# Patient Record
Sex: MALE | Race: WHITE | Employment: FULL TIME | URBAN - METROPOLITAN AREA
[De-identification: names, ages, dates, MRNs, and addresses within clinical notes are randomized per-mention and may not be internally consistent; named-entity substitution may affect disease eponyms.]

---

## 2018-08-25 ENCOUNTER — HOSPITAL ENCOUNTER (OUTPATIENT)
Dept: ULTRASOUND IMAGING | Age: 53
Discharge: HOME | End: 2018-08-25
Attending: NURSE PRACTITIONER
Payer: COMMERCIAL

## 2018-08-25 DIAGNOSIS — R19.09 GROIN SWELLING: ICD-10-CM

## 2018-08-25 PROCEDURE — 76870 US EXAM SCROTUM: CPT

## 2022-02-15 ENCOUNTER — OFFICE VISIT (OUTPATIENT)
Dept: INTERNAL MEDICINE CLINIC | Age: 57
End: 2022-02-15
Payer: COMMERCIAL

## 2022-02-15 VITALS
BODY MASS INDEX: 41.58 KG/M2 | TEMPERATURE: 98.3 F | DIASTOLIC BLOOD PRESSURE: 111 MMHG | OXYGEN SATURATION: 90 % | RESPIRATION RATE: 19 BRPM | SYSTOLIC BLOOD PRESSURE: 176 MMHG | WEIGHT: 307 LBS | HEART RATE: 96 BPM | HEIGHT: 72 IN

## 2022-02-15 DIAGNOSIS — R29.818 SUSPECTED SLEEP APNEA: ICD-10-CM

## 2022-02-15 DIAGNOSIS — I10 ESSENTIAL HYPERTENSION: ICD-10-CM

## 2022-02-15 DIAGNOSIS — R73.09 ELEVATED GLUCOSE: ICD-10-CM

## 2022-02-15 DIAGNOSIS — R06.09 DOE (DYSPNEA ON EXERTION): ICD-10-CM

## 2022-02-15 DIAGNOSIS — E78.2 MIXED HYPERLIPIDEMIA: ICD-10-CM

## 2022-02-15 DIAGNOSIS — Z12.11 COLON CANCER SCREENING: ICD-10-CM

## 2022-02-15 DIAGNOSIS — R63.5 WEIGHT GAIN: ICD-10-CM

## 2022-02-15 DIAGNOSIS — Z76.89 ESTABLISHING CARE WITH NEW DOCTOR, ENCOUNTER FOR: Primary | ICD-10-CM

## 2022-02-15 DIAGNOSIS — R35.1 NOCTURIA: ICD-10-CM

## 2022-02-15 DIAGNOSIS — Z11.59 SCREENING FOR VIRAL DISEASE: ICD-10-CM

## 2022-02-15 DIAGNOSIS — R53.83 FATIGUE, UNSPECIFIED TYPE: ICD-10-CM

## 2022-02-15 LAB
BILIRUB UR QL STRIP: NEGATIVE
GLUCOSE UR-MCNC: NEGATIVE MG/DL
KETONES P FAST UR STRIP-MCNC: NEGATIVE MG/DL
PH UR STRIP: 6 [PH] (ref 4.6–8)
PROT UR QL STRIP: NORMAL
SP GR UR STRIP: 1.03 (ref 1–1.03)
UA UROBILINOGEN AMB POC: NORMAL (ref 0.2–1)
URINALYSIS CLARITY POC: CLEAR
URINALYSIS COLOR POC: YELLOW
URINE BLOOD POC: NEGATIVE
URINE LEUKOCYTES POC: NEGATIVE
URINE NITRITES POC: NEGATIVE

## 2022-02-15 PROCEDURE — 99203 OFFICE O/P NEW LOW 30 MIN: CPT | Performed by: NURSE PRACTITIONER

## 2022-02-15 PROCEDURE — 81003 URINALYSIS AUTO W/O SCOPE: CPT | Performed by: NURSE PRACTITIONER

## 2022-02-15 RX ORDER — LISINOPRIL AND HYDROCHLOROTHIAZIDE 10; 12.5 MG/1; MG/1
1 TABLET ORAL DAILY
Qty: 90 TABLET | Refills: 0 | Status: SHIPPED | OUTPATIENT
Start: 2022-02-15 | End: 2022-04-11 | Stop reason: SDUPTHER

## 2022-02-15 NOTE — PROGRESS NOTES
Subjective: (As above and below)     Chief Complaint   Patient presents with   [address]     pt states he was taking clonidine and lisinopril for BP but stopped a while ago     Sleep Problem     pt states he falls asleep anywhere and fallen d/t sleep  x 1 year has gotten worse in past 5-6 months     Weight Gain     pt states in past year he has put on a lot of weight fast (210lbs a year ago), often has SOB     Mery Mantle. Gale Yee is a 61y.o. year old male who presents for     Est care    Last PCP 3 years ago    Sleep apnea s/s: daytime somnolence, snoring    Weight gain; over the past 3 years has gained nearly 100 lbs  He did quit smoking      Colonoscopy: never done, no GI s/s, no fam hx of colon ca    Prostatic s/s: +nocturia    TREVIZO: able to talk from elevator to office w/o problems but more than that gets winded. No chest pain  No wheezing he quit smoking  He has bilateral lower ext edema, compression stockings help some  Swelling improves w/ elevation  He works at [employer]  S/s present for months    HTN; was on lisinopril and clonidine before, ran out and then lost to f/u  No a/e w/ these meds  Diet needs improvement        Reviewed PmHx, RxHx, FmHx, SocHx, AllgHx and updated in chart. Family History   Family history unknown: Yes       Past Medical History:   Diagnosis Date    Hypertension       Social History     Socioeconomic History    Marital status: SINGLE   Tobacco Use    Smoking status: Former Smoker    Smokeless tobacco: Never Used   Vaping Use    Vaping Use: Never used   Substance and Sexual Activity    Alcohol use:  Yes     Alcohol/week: 15.0 standard drinks     Types: 10 Cans of beer, 5 Shots of liquor per week     Comment: pt states he is unable to give good estimate but does drink a lot     Drug use: Not Currently    Sexual activity: Not Currently     Partners: Female          Current Outpatient Medications   Medication Sig    lisinopril-hydroCHLOROthiazide (PRINZIDE, ZESTORETIC) 10-12.5 mg per tablet Take 1 Tablet by mouth daily. No current facility-administered medications for this visit. Review of Systems:   Constitutional:    Negative for fever and chills, negative diaphoresis. HEENT:              Negative for neck pain and stiffness. Eyes:                  Negative for visual disturbance, itching, redness or discharge. Respiratory:        Negative for cough and shortness of breath. Cardiovascular:  Negative for chest pain and palpitations. Gastrointestinal: Negative for nausea, vomiting, abdominal pain, diarrhea or constipation. Genitourinary:     Negative for dysuria and frequency. Musculoskeletal: Negative for falls, tenderness and swelling. Skin:                    Negative for rash, masses or lesions. Neurological:       Negative for dizzyness, seizure, loss of consciousness, weakness and numbness. Objective:     Vitals:    02/15/22 1037 02/15/22 1105   BP: (!) 173/115 (!) 176/111   Pulse: (!) 101 96   Resp: 19    Temp: 98.3 °F (36.8 °C)    TempSrc: Temporal    SpO2: (!) 85% (!) 85%   Weight: 307 lb (139.3 kg)    Height: 6' (1.829 m)          Gen: Oriented to person, place and time and well-developed, well-nourished and in no distress. HEENT:    Head: normocephalic and atraumatic. Eyes:  EOM are normal. Pupils equal and round. Neck:  Normal range of motion. Neck supple. Cardiovascular: normal rate, regular rhythm and normal heart sounds. Pulmonary/Chest:  Effort normal and breath sounds normal.  No respiratory distress. No wheezes, no rales. Abdominal: soft, normal  bowel sounds. Musculoskeletal:  No edema, no tenderness. No calf tenderness or edema. Neurological:  Alert, oriented to person, place and time. Skin: skin is warm and dry. Assessment/ Plan:     Follow-up and Dispositions    · Return in about 2 weeks (around 3/1/2022) for nv bp check.          1. Establishing care with new doctor, encounter for      2. Suspected sleep apnea    - SLEEP MEDICINE REFERRAL    3. Fatigue, unspecified type      4. Weight gain    - THYROID CASCADE PROFILE; Future    5. TREVIZO (dyspnea on exertion)    - ECHO ADULT COMPLETE; Future  - NT-PRO BNP; Future    6. Screening for viral disease    - HEPATITIS C QT BY PCR WITH REFLEX GENOTYPE; Future    7. Essential hypertension  Discussed low sodium diet  - ECHO ADULT COMPLETE; Future  - lisinopril-hydroCHLOROthiazide (PRINZIDE, ZESTORETIC) 10-12.5 mg per tablet; Take 1 Tablet by mouth daily. Dispense: 90 Tablet; Refill: 0  - LIPID PANEL; Future  - CBC W/O DIFF; Future  - METABOLIC PANEL, COMPREHENSIVE; Future    8. Mixed hyperlipidemia      9. Elevated glucose    - HEMOGLOBIN A1C WITH EAG; Future    10. Nocturia    - AMB POC URINALYSIS DIP STICK AUTO W/O MICRO  - PSA W/ REFLX FREE PSA; Future    11. Colon cancer screening    - COLOGUARD TEST (FECAL DNA COLORECTAL CANCER SCREENING)        I have discussed the diagnosis with the patient and the intended plan as seen in the above orders. The patient has received an after-visit summary and questions were answered concerning future plans. Pt conveyed understanding of plan. Medication Side Effects and Warnings were discussed with patient: yes  Patient Labs were reviewed: yes  Patient Past Records were reviewed:  yes    Gisela Lee.  Esperanza Otero NP

## 2022-02-15 NOTE — PATIENT INSTRUCTIONS
DASH Diet: Care Instructions  Your Care Instructions     The DASH diet is an eating plan that can help lower your blood pressure. DASH stands for Dietary Approaches to Stop Hypertension. Hypertension is high blood pressure. The DASH diet focuses on eating foods that are high in calcium, potassium, and magnesium. These nutrients can lower blood pressure. The foods that are highest in these nutrients are fruits, vegetables, low-fat dairy products, nuts, seeds, and legumes. But taking calcium, potassium, and magnesium supplements instead of eating foods that are high in those nutrients does not have the same effect. The DASH diet also includes whole grains, fish, and poultry. The DASH diet is one of several lifestyle changes your doctor may recommend to lower your high blood pressure. Your doctor may also want you to decrease the amount of sodium in your diet. Lowering sodium while following the DASH diet can lower blood pressure even further than just the DASH diet alone. Follow-up care is a key part of your treatment and safety. Be sure to make and go to all appointments, and call your doctor if you are having problems. It's also a good idea to know your test results and keep a list of the medicines you take. How can you care for yourself at home? Following the DASH diet  · Eat 4 to 5 servings of fruit each day. A serving is 1 medium-sized piece of fruit, ½ cup chopped or canned fruit, 1/4 cup dried fruit, or 4 ounces (½ cup) of fruit juice. Choose fruit more often than fruit juice. · Eat 4 to 5 servings of vegetables each day. A serving is 1 cup of lettuce or raw leafy vegetables, ½ cup of chopped or cooked vegetables, or 4 ounces (½ cup) of vegetable juice. Choose vegetables more often than vegetable juice. · Get 2 to 3 servings of low-fat and fat-free dairy each day. A serving is 8 ounces of milk, 1 cup of yogurt, or 1 ½ ounces of cheese. · Eat 6 to 8 servings of grains each day.  A serving is 1 slice of bread, 1 ounce of dry cereal, or ½ cup of cooked rice, pasta, or cooked cereal. Try to choose whole-grain products as much as possible. · Limit lean meat, poultry, and fish to 2 servings each day. A serving is 3 ounces, about the size of a deck of cards. · Eat 4 to 5 servings of nuts, seeds, and legumes (cooked dried beans, lentils, and split peas) each week. A serving is 1/3 cup of nuts, 2 tablespoons of seeds, or ½ cup of cooked beans or peas. · Limit fats and oils to 2 to 3 servings each day. A serving is 1 teaspoon of vegetable oil or 2 tablespoons of salad dressing. · Limit sweets and added sugars to 5 servings or less a week. A serving is 1 tablespoon jelly or jam, ½ cup sorbet, or 1 cup of lemonade. · Eat less than 2,300 milligrams (mg) of sodium a day. If you limit your sodium to 1,500 mg a day, you can lower your blood pressure even more. · Be aware that all of these are the suggested number of servings for people who eat 1,800 to 2,000 calories a day. Your recommended number of servings may be different if you need more or fewer calories. Tips for success  · Start small. Do not try to make dramatic changes to your diet all at once. You might feel that you are missing out on your favorite foods and then be more likely to not follow the plan. Make small changes, and stick with them. Once those changes become habit, add a few more changes. · Try some of the following:  ? Make it a goal to eat a fruit or vegetable at every meal and at snacks. This will make it easy to get the recommended amount of fruits and vegetables each day. ? Try yogurt topped with fruit and nuts for a snack or healthy dessert. ? Add lettuce, tomato, cucumber, and onion to sandwiches. ? Combine a ready-made pizza crust with low-fat mozzarella cheese and lots of vegetable toppings. Try using tomatoes, squash, spinach, broccoli, carrots, cauliflower, and onions. ?  Have a variety of cut-up vegetables with a low-fat dip as an appetizer instead of chips and dip. ? Sprinkle sunflower seeds or chopped almonds over salads. Or try adding chopped walnuts or almonds to cooked vegetables. ? Try some vegetarian meals using beans and peas. Add garbanzo or kidney beans to salads. Make burritos and tacos with mashed hagan beans or black beans. Where can you learn more? Go to http://www.ramirez.com/  Enter H967 in the search box to learn more about \"DASH Diet: Care Instructions. \"  Current as of: April 29, 2021               Content Version: 13.0  © 2828-5057 [employer]. Care instructions adapted under license by [employer] (which disclaims liability or warranty for this information). If you have questions about a medical condition or this instruction, always ask your healthcare professional. [address] any warranty or liability for your use of this information.

## 2022-02-15 NOTE — PROGRESS NOTES
Chief Complaint   Patient presents with    Establish Care    Sleep Problem     pt states he falls asleep anywhere x 1 year has gotten worse in past 5-6 months        1. Have you been to the ER, urgent care clinic since your last visit? Hospitalized since your last visit? No    2. Have you seen or consulted any other health care providers outside of the [address] [place] [address] since your last visit? Include any pap smears or colon screening.  No

## 2022-02-18 ENCOUNTER — TELEPHONE (OUTPATIENT)
Dept: INTERNAL MEDICINE CLINIC | Age: 57
End: 2022-02-18

## 2022-02-18 NOTE — TELEPHONE ENCOUNTER
lmov  Calling about labs and to check on symptoms  BNP high, worried for fluid overload  [facility] ED if worsening sob, leg swelling  Otherwise, take BP med w/ fluid pill that was given and can he come in Monday for NV BP check?

## 2022-02-18 NOTE — TELEPHONE ENCOUNTER
Informed pt of provider's response, pt verbalized understanding.  Pt states he can come in Monday for NV around 10/11 but would like a call from provider today in regards to labs

## 2022-03-10 ENCOUNTER — HOSPITAL ENCOUNTER (OUTPATIENT)
Dept: NON INVASIVE DIAGNOSTICS | Age: 57
Discharge: HOME | End: 2022-03-10
Attending: NURSE PRACTITIONER
Payer: COMMERCIAL

## 2022-03-10 VITALS
BODY MASS INDEX: 41.6 KG/M2 | HEIGHT: 72 IN | SYSTOLIC BLOOD PRESSURE: 176 MMHG | WEIGHT: 307.1 LBS | DIASTOLIC BLOOD PRESSURE: 111 MMHG

## 2022-03-10 DIAGNOSIS — I10 ESSENTIAL HYPERTENSION: ICD-10-CM

## 2022-03-10 DIAGNOSIS — R06.09 DOE (DYSPNEA ON EXERTION): ICD-10-CM

## 2022-03-10 LAB
ECHO AO ROOT DIAM: 3.4 CM
ECHO AO ROOT INDEX: 1.33 CM/M2
ECHO AR MAX VEL PISA: 1.9 M/S
ECHO AV AREA PEAK VELOCITY: 2.7 CM2
ECHO AV AREA PEAK VELOCITY: 2.7 CM2
ECHO AV MEAN GRADIENT: 3 MMHG
ECHO AV MEAN VELOCITY: 0.9 M/S
ECHO AV PEAK GRADIENT: 5 MMHG
ECHO AV PEAK VELOCITY: 1.2 M/S
ECHO AV REGURGITANT PHT: 499.6 MILLISECOND
ECHO AV VELOCITY RATIO: 0.92
ECHO AV VTI: 17.6 CM
ECHO EST RA PRESSURE: 10 MMHG
ECHO LA DIAMETER INDEX: 1.41 CM/M2
ECHO LA DIAMETER: 3.6 CM
ECHO LA TO AORTIC ROOT RATIO: 1.06
ECHO LA VOL 4C: 61 ML (ref 18–58)
ECHO LA VOLUME INDEX A4C: 24 ML/M2 (ref 16–34)
ECHO LV E' LATERAL VELOCITY: 10 CM/S
ECHO LV EDV A4C: 133 ML
ECHO LV EDV INDEX A4C: 52 ML/M2
ECHO LV EJECTION FRACTION A4C: 41 %
ECHO LV ESV A4C: 78 ML
ECHO LV ESV INDEX A4C: 30 ML/M2
ECHO LV FRACTIONAL SHORTENING: 25 % (ref 28–44)
ECHO LV INTERNAL DIMENSION DIASTOLE INDEX: 1.56 CM/M2
ECHO LV INTERNAL DIMENSION DIASTOLIC: 4 CM (ref 4.2–5.9)
ECHO LV INTERNAL DIMENSION SYSTOLIC INDEX: 1.17 CM/M2
ECHO LV INTERNAL DIMENSION SYSTOLIC: 3 CM
ECHO LV IVSD: 1.7 CM (ref 0.6–1)
ECHO LV MASS 2D: 284.5 G (ref 88–224)
ECHO LV MASS INDEX 2D: 111.1 G/M2 (ref 49–115)
ECHO LV POSTERIOR WALL DIASTOLIC: 1.7 CM (ref 0.6–1)
ECHO LV RELATIVE WALL THICKNESS RATIO: 0.85
ECHO LVOT AREA: 2.8 CM2
ECHO LVOT DIAM: 1.9 CM
ECHO LVOT PEAK GRADIENT: 5 MMHG
ECHO LVOT PEAK VELOCITY: 1.1 M/S
ECHO MV A VELOCITY: 1.08 M/S
ECHO MV E DECELERATION TIME (DT): 107.7 MS
ECHO MV E VELOCITY: 0.91 M/S
ECHO MV E/A RATIO: 0.84
ECHO MV E/E' LATERAL: 9.1
ECHO MV MAX VELOCITY: 1.2 M/S
ECHO MV MEAN GRADIENT: 3 MMHG
ECHO MV MEAN VELOCITY: 0.8 M/S
ECHO MV PEAK GRADIENT: 6 MMHG
ECHO MV REGURGITANT PEAK GRADIENT: 121 MMHG
ECHO MV REGURGITANT PEAK VELOCITY: 5.5 M/S
ECHO MV VTI: 18.2 CM
ECHO PULMONARY ARTERY END DIASTOLIC PRESSURE: 11 MMHG
ECHO PV MAX VELOCITY: 1.3 M/S
ECHO PV PEAK GRADIENT: 6 MMHG
ECHO PV REGURGITANT MAX VELOCITY: 1.7 M/S
ECHO RIGHT VENTRICULAR SYSTOLIC PRESSURE (RVSP): 58 MMHG
ECHO TV REGURGITANT MAX VELOCITY: 3.46 M/S
ECHO TV REGURGITANT PEAK GRADIENT: 48 MMHG

## 2022-03-10 PROCEDURE — 93306 TTE W/DOPPLER COMPLETE: CPT

## 2022-03-23 ENCOUNTER — OFFICE VISIT (OUTPATIENT)
Dept: SLEEP MEDICINE | Age: 57
End: 2022-03-23
Payer: COMMERCIAL

## 2022-03-23 VITALS
SYSTOLIC BLOOD PRESSURE: 172 MMHG | TEMPERATURE: 98.3 F | DIASTOLIC BLOOD PRESSURE: 106 MMHG | HEART RATE: 84 BPM | BODY MASS INDEX: 39.89 KG/M2 | HEIGHT: 72 IN | OXYGEN SATURATION: 93 % | RESPIRATION RATE: 20 BRPM | WEIGHT: 294.5 LBS

## 2022-03-23 DIAGNOSIS — E66.2 HYPOVENTILATION ASSOCIATED WITH OBESITY SYNDROME (HCC): ICD-10-CM

## 2022-03-23 DIAGNOSIS — I10 PRIMARY HYPERTENSION: ICD-10-CM

## 2022-03-23 DIAGNOSIS — G47.33 OSA (OBSTRUCTIVE SLEEP APNEA): Primary | ICD-10-CM

## 2022-03-23 PROCEDURE — 99204 OFFICE O/P NEW MOD 45 MIN: CPT | Performed by: INTERNAL MEDICINE

## 2022-03-23 NOTE — PROGRESS NOTES
[address] [facility] [place]. [place], [address]  Tel.  [phone]  Fax. [address] [facility] [address]  [place], [address] [facility]  Tel.  [phone]  Fax. [phone] [address]  Tel.  [phone]  Fax. [phone]       Valencia Faulkner is a 61y.o. year old male referred by Claudia Meade NP  for evaluation of a sleep disorder. ASSESSMENT/PLAN:      ICD-10-CM ICD-9-CM    1. SIGIFREDO (obstructive sleep apnea)  G47.33 327.23 POLYSOMNOGRAPHY 1 NIGHT   2. Hypoventilation associated with obesity syndrome (HCC)  E66.2 278.03 POLYSOMNOGRAPHY 1 NIGHT   3. Primary hypertension  I10 401.9    4. BMI 39.0-39.9,adult  Z68.39 V85.39        Patient has a history and examination consistent with the diagnosis of sleep apnea. Follow-up and Dispositions    · Return for telephone follow-up after testing is completed. * The patient currently has a High Risk for having sleep apnea. STOP-BANG score 8.    * Sleep testing was ordered for initial evaluation. Orders Placed This Encounter    POLYSOMNOGRAPHY 1 NIGHT     Standing Status:   Future     Standing Expiration Date:   6/23/2022     Scheduling Instructions:      Perform ETCO2 monitoring during Polysomnography     Order Specific Question:   Reason for Exam     Answer:   SIGIFREDO       * He was provided information on sleep apnea including corresponding risk factors and the importance of proper treatment. * Treatment options were reviewed in detail. he would like to proceed with PAP therapy. Patient will be seen in follow-up in 6-8 weeks after PAP setup to gauge treatment response and adherence to therapy. * The patient was counseled regarding proper sleep hygiene, with emphasis on ensuring sufficient total sleep time; safe driving and the benefits of exercise and weight loss. * All of his questions were addressed.     2.  Hypoventilation associated with obesity syndrome (HCC) - Elevated bicarbonate levels on metabolic screen in the absence of any significant cardio-pulmonary problem is suggestive of hypoventilation associated with obesity. EtCO2 levels will be monitored during attended polysomnography, this cannot be done on home sleep apnea testing. 3. Hypertension -  continue on current regimen, he will continue to monitor his BP and follow up with his primary care provider for reevaluation/adjustment of medications if warranted. I have reviewed the relationship between hypertension as it relates to sleep-disordered breathing. 4. Recommended a dedicated weight loss program through appropriate diet and exercise regimen as significant weight reduction has been shown to reduce severity of obstructive sleep apnea. SUBJECTIVE/OBJECTIVE:    Lawyer Robbins is an 61 y.o. male referred for evaluation for a sleep disorder. He complains of snoring associated with periods of not breathing. Symptoms began several years ago, gradually worsening since that time. He usually can fall asleep in 5 minutes. Family or house members note snoring and periods of not breathing. He denies of symptoms indicative of cataplexy, sleep paralysis or sleep related hallucinations. He denies of a history of unusual movements occurring during sleep. Lawyer Robbins does not wake up frequently at night. He is not bothered by waking up too early and left unable to get back to sleep. He actually sleeps about 9 hours at night and wakes up about 3 times during the night. He does work shifts: Second Shift. Benjamin Stafford indicates he does not get too little sleep at night. His bedtime is 0400. He awakens at 1400. He does take naps. He takes 5 naps a week lasting 2 hours. He has the following observed behaviors: Loud snoring,Twitching of legs or feet,Grinding teeth,Kicking with legs,Becoming very rigid and/or shaking;  (vivid dreams).   Other remarks:       CO2 21 - 32 mmol/L 31      The patient has not undergone diagnostic testing for the current problems. Review of Systems:  Constitutional:  No significant weight loss or weight gain  Eyes:  No blurred vision  CVS:  No significant chest pain  Pulm:  No significant shortness of breath  GI:  No significant nausea or vomiting  :  No significant nocturia  Musculoskeletal:  No significant joint pain at night  Skin:  No significant rashes  Neuro:  No significant dizziness   Psych:  No active mood issues    Sleep Review of Systems: notable for Negative difficulty falling asleep; Positive awakenings at night; Positive perceived regular dreaming; Positive nightmares; Positive  early morning headaches; Negative  memory problems; Negative  concentration issues; Negative caffeine;  Negative alcohol;   Negative history of any automobile or occupational accidents due to daytime drowsiness. [place] Sleepiness Score: 22   and Modified F.O.S.Q. Score Total / 2: 6    Visit Vitals  BP (!) 172/106 (BP 1 Location: Right arm, BP Patient Position: Sitting, BP Cuff Size: Large adult)   Pulse 84   Temp 98.3 °F (36.8 °C) (Temporal)   Resp 20   Ht 6' (1.829 m)   Wt 294 lb 8 oz (133.6 kg)   SpO2 93%   BMI 39.94 kg/m²           General:   Alert, oriented, not in acute distress   Eyes:  Anicteric Sclerae; intact EOM's   Nose:  No obvious nasal septum deviation    Oropharynx:   Mallampati score 4, thick tongue base, uvula not seen due to low-lying soft palate, narrow tonsilo-pharyngeal pilars, tongue scalloped   Neck:   midline trachea,  no JVD   Chest/Lungs:  symmetrical lung expansion ,clear lung fields on auscultation    CVS:  Normal rate, regular rhythm    Extremities:  No obvious rashes, moderate edema    Neuro:  No focal deficits; No obvious tremor    Psych:  Normal eye contact; normal  affect, normal countenance          Bryson Gerardo MD, FAASM  Diplomate American Board of Sleep Medicine  Diplomate in Sleep Medicine - ABP    Electronically signed.  03/23/22

## 2022-03-23 NOTE — PATIENT INSTRUCTIONS
217 [facility] [place]. [place], [address]  Tel.  [phone]  Fax. 100 [facility] 60  [place], [address] [facility]  Tel.  [phone]  Fax. [phone] [address]  Tel.  [phone]  Fax. [phone]     Sleep Apnea: After Your Visit  Your Care Instructions  Sleep apnea occurs when you frequently stop breathing for 10 seconds or longer during sleep. It can be mild to severe, based on the number of times per hour that you stop breathing or have slowed breathing. Blocked or narrowed airways in your nose, mouth, or throat can cause sleep apnea. Your airway can become blocked when your throat muscles and tongue relax during sleep. Sleep apnea is common, occurring in 1 out of 20 individuals. Individuals having any of the following characteristics should be evaluated and treated right away due to high risk and detrimental consequences from untreated sleep apnea:  1. Obesity  2. Congestive Heart failure  3. Atrial Fibrillation  4. Uncontrolled Hypertension  5. Type II Diabetes  6. Night-time Arrhythmias  7. Stroke  8. Pulmonary Hypertension  9. High-risk Driving Populations (pilots, truck drivers, etc.)  10. Patients Considering Weight-loss Surgery    How do you know you have sleep apnea? You probably have sleep apnea if you answer 'yes' to 3 or more of the following questions:  S - Have you been told that you Snore? T - Are you often Tired during the day? O - Has anyone Observed you stop breathing while sleeping? P- Do you have (or are being treated for) high blood Pressure? B - Are you obese (Body Mass Index > 35)? A - Is your Age 48years old or older? N - Is your Neck size greater than 16 inches? G - Are you male Gender? A sleep physician can prescribe a breathing device that prevents tissues in the throat from blocking your airway.  Or your doctor may recommend using a dental device (oral breathing device) to help keep your airway open. In some cases, surgery may be needed to remove enlarged tissues in the throat. Follow-up care is a key part of your treatment and safety. Be sure to make and go to all appointments, and call your doctor if you are having problems. It's also a good idea to know your test results and keep a list of the medicines you take. How can you care for yourself at home? · Lose weight, if needed. It may reduce the number of times you stop breathing or have slowed breathing. · Go to bed at the same time every night. · Sleep on your side. It may stop mild apnea. If you tend to roll onto your back, sew a pocket in the back of your pajama top. Put a tennis ball into the pocket, and stitch the pocket shut. This will help keep you from sleeping on your back. · Avoid alcohol and medicines such as sleeping pills and sedatives before bed. · Do not smoke. Smoking can make sleep apnea worse. If you need help quitting, talk to your doctor about stop-smoking programs and medicines. These can increase your chances of quitting for good. · Prop up the head of your bed 4 to 6 inches by putting bricks under the legs of the bed. · Treat breathing problems, such as a stuffy nose, caused by a cold or allergies. · Use a continuous positive airway pressure (CPAP) breathing machine if lifestyle changes do not help your apnea and your doctor recommends it. The machine keeps your airway from closing when you sleep. · If CPAP does not help you, ask your doctor whether you should try other breathing machines. A bilevel positive airway pressure machine has two types of air pressureâone for breathing in and one for breathing out. Another device raises or lowers air pressure as needed while you breathe. · If your nose feels dry or bleeds when using one of these machines, talk with your doctor about increasing moisture in the air. A humidifier may help.   · If your nose is runny or stuffy from using a breathing machine, talk with your doctor about using decongestants or a corticosteroid nasal spray. When should you call for help? Watch closely for changes in your health, and be sure to contact your doctor if:  · You still have sleep apnea even though you have made lifestyle changes. · You are thinking of trying a device such as CPAP. · You are having problems using a CPAP or similar machine. Where can you learn more? Go to [employer]. Enter G536 in the search box to learn more about \"Sleep Apnea: After Your Visit. \"   © 6292-8727 Healthwise, Incorporated. Care instructions adapted under license by [facility] [employer] (which disclaims liability or warranty for this information). This care instruction is for use with your licensed healthcare professional. If you have questions about a medical condition or this instruction, always ask your healthcare professional. Darylene Brisk any warranty or liability for your use of this information. PROPER SLEEP HYGIENE    What to avoid  · Do not have drinks with caffeine, such as coffee or black tea, for 8 hours before bed. · Do not smoke or use other types of tobacco near bedtime. Nicotine is a stimulant and can keep you awake. · Avoid drinking alcohol late in the evening, because it can cause you to wake in the middle of the night. · Do not eat a big meal close to bedtime. If you are hungry, eat a light snack. · Do not drink a lot of water close to bedtime, because the need to urinate may wake you up during the night. · Do not read or watch TV in bed. Use the bed only for sleeping and sexual activity. What to try  · Go to bed at the same time every night, and wake up at the same time every morning. Do not take naps during the day. · Keep your bedroom quiet, dark, and cool. · Get regular exercise, but not within 3 to 4 hours of your bedtime. .  · Sleep on a comfortable pillow and mattress.   · If watching the clock makes you anxious, turn it facing away from you so you cannot see the time. · If you worry when you lie down, start a worry book. Well before bedtime, write down your worries, and then set the book and your concerns aside. · Try meditation or other relaxation techniques before you go to bed. · If you cannot fall asleep, get up and go to another room until you feel sleepy. Do something relaxing. Repeat your bedtime routine before you go to bed again. · Make your house quiet and calm about an hour before bedtime. Turn down the lights, turn off the TV, log off the computer, and turn down the volume on music. This can help you relax after a busy day. Drowsy Driving  The [address] [place] [address] Road Traffic Safety Administration cites drowsiness as a causing factor in more than 295,063 police reported crashes annually, resulting in 76,000 injuries and 1,500 deaths. Other surveys suggest 55% of people polled have driven while drowsy in the past year, 23% had fallen asleep but not crashed, 3% crashed, and 2% had and accident due to drowsy driving. Who is at risk? Young Drivers: One study of drowsy driving accidents states that 55% of the drivers were under 25 years. Of those, 75% were male. Shift Workers and Travelers: People who work overnight or travel across time zones frequently are at higher risk of experiencing Circadian Rhythm Disorders. They are trying to work and function when their body is programed to sleep. Sleep Deprived: Lack of sleep has a serious impact on your ability to pay attention or focus on a task. Consistently getting less than the average of 8 hours your body needs creates partial or cumulative sleep deprivation. Untreated Sleep Disorders: Sleep Apnea, Narcolepsy, R.L.S., and other sleep disorders (untreated) prevent a person from getting enough restful sleep. This leads to excessive daytime sleepiness and increases the risk for drowsy driving accidents by up to 7 times.   Medications / Alcohol: Even over the counter medications can cause drowsiness. Medications that impair a drivers attention should have a warning label. Alcohol naturally makes you sleepy and on its own can cause accidents. Combined with excessive drowsiness its effects are amplified. Signs of Drowsy Driving:   * You don't remember driving the last few miles   * You may drift out of your shayla   * You are unable to focus and your thoughts wander   * You may yawn more often than normal   * You have difficulty keeping your eyes open / nodding off   * Missing traffic signs, speeding, or tailgating  Prevention-   Good sleep hygiene, lifestyle and behavioral choices have the most impact on drowsy driving. There is no substitute for sleep and the average person requires 8 hours nightly. If you find yourself driving drowsy, stop and sleep. Consider the sleep hygiene tips provided during your visit as well. Medication Refill Policy: Refills for all medications require 1 week advance notice. Please have your pharmacy fax a refill request. We are unable to fax, or call in \"controled substance\" medications and you will need to pick these prescriptions up from our office. [email or web address] ([email or web address]) Activation    Thank you for requesting access to [email or web address]). Please follow the instructions below to securely access and download your online medical record. [email or web address] ([email or web address]) allows you to send messages to your doctor, view your test results, renew your prescriptions, schedule appointments, and more. How Do I Sign Up? 1. In your internet browser, go to [email or web address]. 2. Click on the First Time User? Click Here link in the Sign In box. You will see the New Member Sign Up page. 3. Enter your 365looks ([email or web address]) Access Code exactly as it appears below. You will not need to use this code after youve completed the sign-up process. If you do not sign up before the expiration date, you must request a new code.     [email or web address] ([email or web address]) Access Code: [email or web address]  Expires: 4/11/2022  5:37 PM (This is the date your Panelfly access code will )    4. Enter the last four digits of your Social Security Number (xxxx) and Date of Birth (mm/dd/yyyy) as indicated and click Submit. You will be taken to the next sign-up page. 5. Create a Qu[employer]ient Biodiagnosticst ID. This will be your Panelfly login ID and cannot be changed, so think of one that is secure and easy to remember. 6. Create a Panelfly password. You can change your password at any time. 7. Enter your Password Reset Question and Answer. This can be used at a later time if you forget your password. 8. Enter your e-mail address. You will receive e-mail notification when new information is available in [address] 9. Click Sign Up. You can now view and download portions of your medical record. 10. Click the Download Summary menu link to download a portable copy of your medical information. Additional Information    If you have questions, please call [phone]. Remember, Panelfly is NOT to be used for urgent needs. For medical emergencies, dial 911.

## 2022-03-28 ENCOUNTER — HOSPITAL ENCOUNTER (OUTPATIENT)
Dept: SLEEP MEDICINE | Age: 57
Discharge: HOME | End: 2022-03-28

## 2022-03-28 DIAGNOSIS — E66.2 HYPOVENTILATION ASSOCIATED WITH OBESITY SYNDROME (HCC): ICD-10-CM

## 2022-03-28 DIAGNOSIS — G47.33 OSA (OBSTRUCTIVE SLEEP APNEA): ICD-10-CM

## 2022-04-07 ENCOUNTER — TELEPHONE (OUTPATIENT)
Dept: SLEEP MEDICINE | Age: 57
End: 2022-04-07

## 2022-04-07 ENCOUNTER — DOCUMENTATION ONLY (OUTPATIENT)
Dept: SLEEP MEDICINE | Age: 57
End: 2022-04-07

## 2022-04-07 ENCOUNTER — HOSPITAL ENCOUNTER (OUTPATIENT)
Dept: SLEEP MEDICINE | Age: 57
Discharge: HOME | End: 2022-04-07
Payer: COMMERCIAL

## 2022-04-07 VITALS
DIASTOLIC BLOOD PRESSURE: 114 MMHG | HEIGHT: 72 IN | HEART RATE: 99 BPM | BODY MASS INDEX: 39.89 KG/M2 | WEIGHT: 294.5 LBS | SYSTOLIC BLOOD PRESSURE: 187 MMHG | OXYGEN SATURATION: 87 % | TEMPERATURE: 97.8 F

## 2022-04-07 DIAGNOSIS — G47.33 OSA (OBSTRUCTIVE SLEEP APNEA): Primary | ICD-10-CM

## 2022-04-07 DIAGNOSIS — G47.33 OSA (OBSTRUCTIVE SLEEP APNEA): ICD-10-CM

## 2022-04-07 PROCEDURE — 95810 POLYSOM 6/> YRS 4/> PARAM: CPT | Performed by: INTERNAL MEDICINE

## 2022-04-07 NOTE — TELEPHONE ENCOUNTER
Orders Placed This Encounter    POLYSOMNOGRAPHY 1 NIGHT     Standing Status:   Future     Standing Expiration Date:   10/7/2022     Scheduling Instructions:      Perform ETCO2 monitoring during Polysomnography     Order Specific Question:   Reason for Exam     Answer:   SIGIFREDO / OHS

## 2022-04-07 NOTE — TELEPHONE ENCOUNTER
Request is for PSG order - patient scheduled at [facility], 4/7/2022.

## 2022-04-08 NOTE — PROGRESS NOTES
217 [facility]., [facility]. [place], [address]  Tel.  [phone]  Fax. 100 [facility] 60  [place], [address] [facility]  Tel.  [phone]  Fax. [phone] 9[address] [facility] [address]  Tel.  [phone]  Fax. [phone]     Sleep Study Technical Notes        PRE-Test:  Nikos Loco (: 1965) arrived in the lobby. Patient was greeted and screening questions asked. The patient was taken to the Sleep Center and taken directly to his/her room.  Vitals:  o Temperature (97.8)   o BP (187/114)   o SaO2 (87%)   o Weight per patient (294.5)   Procedure explained to the patient and questions were answered. The patient expressed understanding of the procedure. Electrodes were applied without incident. The patient was placed in bed and the study was started.  PAP mask acclimation for **min. Patient NA did/didn't tolerate mask.  Sleep aid taken:  No      Acquisition Notes:   Lights off: 9:49 PM     Respiratory events: Hypopneas   ECG:  NSR w/PVC's   Snoring:  Mild   PAP titration: No  o Eliminated events: NA  o Reduced events: NA  o Events at  final pressure NA   Desensitization Mask(s) Used: NA   Positional therapy with:  NA Other comments: No  o Patient had caregiver in attendance:  No  o Patient watched TV or on phone after lights out for 0 hours  o Patient slept with positional therapy:  No used  2 pillows/slumber bumper/wedge  o Patient slept with head of bed elevated:  No  o Patient wore an oral appliance:  No  o Patient to bathroom 5 times  o Pediatric Patient:  - Parent accompanied patient: NA  - Parent slept in bed with patient: NA      POST Test:   Patient was awakened. Electrodes were removed. The patient was discharged after answering the Post Questionnaire. Patient stated that he was alert and ok to drive.  Equipment and room cleaned per infection control policy.

## 2022-04-11 ENCOUNTER — TELEPHONE (OUTPATIENT)
Dept: SLEEP MEDICINE | Age: 57
End: 2022-04-11

## 2022-04-11 ENCOUNTER — OFFICE VISIT (OUTPATIENT)
Dept: INTERNAL MEDICINE CLINIC | Age: 57
End: 2022-04-11
Payer: COMMERCIAL

## 2022-04-11 VITALS
WEIGHT: 289 LBS | HEIGHT: 72 IN | OXYGEN SATURATION: 90 % | SYSTOLIC BLOOD PRESSURE: 172 MMHG | HEART RATE: 98 BPM | TEMPERATURE: 98.4 F | RESPIRATION RATE: 19 BRPM | DIASTOLIC BLOOD PRESSURE: 108 MMHG | BODY MASS INDEX: 39.14 KG/M2

## 2022-04-11 DIAGNOSIS — G47.33 OSA (OBSTRUCTIVE SLEEP APNEA): Primary | ICD-10-CM

## 2022-04-11 DIAGNOSIS — I10 ESSENTIAL HYPERTENSION: ICD-10-CM

## 2022-04-11 DIAGNOSIS — E66.2 HYPOVENTILATION ASSOCIATED WITH OBESITY SYNDROME (HCC): ICD-10-CM

## 2022-04-11 DIAGNOSIS — G47.33 OSA (OBSTRUCTIVE SLEEP APNEA): ICD-10-CM

## 2022-04-11 DIAGNOSIS — R73.03 PREDIABETES: ICD-10-CM

## 2022-04-11 DIAGNOSIS — I50.22 CHRONIC SYSTOLIC CONGESTIVE HEART FAILURE (HCC): Primary | ICD-10-CM

## 2022-04-11 PROCEDURE — 99214 OFFICE O/P EST MOD 30 MIN: CPT | Performed by: NURSE PRACTITIONER

## 2022-04-11 RX ORDER — FUROSEMIDE 20 MG/1
20 TABLET ORAL DAILY
Qty: 90 TABLET | Refills: 0 | Status: SHIPPED | OUTPATIENT
Start: 2022-04-11 | End: 2022-07-18 | Stop reason: SDUPTHER

## 2022-04-11 RX ORDER — LISINOPRIL AND HYDROCHLOROTHIAZIDE 10; 12.5 MG/1; MG/1
1 TABLET ORAL DAILY
Qty: 90 TABLET | Refills: 0 | Status: SHIPPED | OUTPATIENT
Start: 2022-04-11 | End: 2022-08-18

## 2022-04-11 RX ORDER — METOPROLOL SUCCINATE 25 MG/1
25 TABLET, EXTENDED RELEASE ORAL DAILY
Qty: 90 TABLET | Refills: 0 | Status: SHIPPED | OUTPATIENT
Start: 2022-04-11 | End: 2022-07-18 | Stop reason: SDUPTHER

## 2022-04-11 NOTE — PROGRESS NOTES
Chief Complaint   Patient presents with    Abnormal Lab Results     1. Have you been to the ER, urgent care clinic since your last visit? Hospitalized since your last visit? No    2. Have you seen or consulted any other health care providers outside of the [address] [place] [address] since your last visit? Include any pap smears or colon screening.  No

## 2022-04-11 NOTE — PATIENT INSTRUCTIONS
Learning About High-Potassium Foods  What foods are high in potassium? The foods you eat contain nutrients, such as vitamins and minerals. Potassium is a nutrient. Your body needs the right amount to stay healthy and work as it should. You can use the list below to help you make choices about which foods to eat. The foods in this list have at least 200 milligrams (mg) of potassium per serving. Fruits  · Apricots (dried), ¼ cup  · Avocado, ½ fruit  · Banana, 1 medium  · [place], 1 fruit  · Nectarine, 1 fruit  · Orange, 1 fruit  · Prunes, 5 fruits  · Raisins, ¼ cup  Vegetables  · Artichoke, 1 medium  · Beets, ½ cup  · Broccoli, ½ cup  · [place] sprouts, ½ cup  · Potato with skin, 1 medium  · Spinach, ½ cup  · Sweet potato, 1 medium  · Tomato sauce, ½ cup  · Zucchini, ½ cup  Dairy and dairy alternatives  · Milk, 1 cup  · Soy milk, 1 cup  · Yogurt, 6 oz  Meats and other protein foods  · Beans (lima, navy, white), ½ cup  · Beef, ground, 3 oz  · Chicken, 3 oz  · Fish (halibut, tuna, cod, snapper), 3 oz  · Nuts (almonds, hazelnuts, [place], cashew, pistachios), 1 oz  · Peanut butter, 2 Tbsp  · Peanuts, 1 oz  · [place] 3 oz  Seasonings  · Salt substitutes  Work with your doctor to find out how much of this nutrient you need. Depending on your health, you may need more or less of it in your diet. Where can you learn more? Go to http://www.gray.com/  Enter P450 in the search box to learn more about \"Learning About High-Potassium Foods. \"  Current as of: September 8, 2021               Content Version: 13.2  © 2573-5823 Healthwise, Incorporated. Care instructions adapted under license by [employer] (which disclaims liability or warranty for this information). If you have questions about a medical condition or this instruction, always ask your healthcare professional. [place] [address] any warranty or liability for your use of this information.

## 2022-04-11 NOTE — PROGRESS NOTES
Subjective: (As above and below)     Chief Complaint   Patient presents with    Abnormal Lab Results     Bela Nunez. Mervin Sykes is a 61y.o. year old male who presents for     Hypertension ROS:  taking medications as instructed, no medication side effects noted, no TIAs, no chest pain on exertion, no dyspnea on exertion    He has to take an [place] Krystian here which is why he has not been in sooner    Lab review:    Pre-DM: discussed diet which has been high in carbs but he has been reducing, has lost some weight    SIGIFREDO: had sleep study, results pending    Systolic CHF: per ECHO per 50--55%,  He has bilateral lower ext edema  TREVIZO at baseline  No cp      Reviewed PmHx, RxHx, FmHx, SocHx, AllgHx and updated in chart. Family History   Family history unknown: Yes       Past Medical History:   Diagnosis Date    Hypertension       Social History     Socioeconomic History    Marital status: SINGLE   Tobacco Use    Smoking status: Former Smoker    Smokeless tobacco: Never Used   Vaping Use    Vaping Use: Never used   Substance and Sexual Activity    Alcohol use: Yes     Alcohol/week: 15.0 standard drinks     Types: 10 Cans of beer, 5 Shots of liquor per week     Comment: pt states he is unable to give good estimate but does drink a lot     Drug use: Not Currently    Sexual activity: Not Currently     Partners: Female          Current Outpatient Medications   Medication Sig    metoprolol succinate (TOPROL-XL) 25 mg XL tablet Take 1 Tablet by mouth daily.  furosemide (LASIX) 20 mg tablet Take 1 Tablet by mouth daily. Additional fluid pill    lisinopril-hydroCHLOROthiazide (PRINZIDE, ZESTORETIC) 10-12.5 mg per tablet Take 1 Tablet by mouth daily. No current facility-administered medications for this visit. Review of Systems:   Constitutional:    Negative for fever and chills, negative diaphoresis. HEENT:              Negative for neck pain and stiffness.   Eyes:                  Negative for visual disturbance, itching, redness or discharge. Respiratory:        Negative for cough and shortness of breath. Cardiovascular:  Negative for chest pain and palpitations. Gastrointestinal: Negative for nausea, vomiting, abdominal pain, diarrhea or constipation. Genitourinary:     Negative for dysuria and frequency. Musculoskeletal: Negative for falls, tenderness and swelling. Skin:                    Negative for rash, masses or lesions. Neurological:       Negative for dizzyness, seizure, loss of consciousness, weakness and numbness.      Objective:     Vitals:    04/11/22 1105   BP: (!) 172/108   Pulse: (!) 105   Resp: 19   Temp: 98.4 °F (36.9 °C)   TempSrc: Temporal   SpO2: (!) 85%   Weight: 289 lb (131.1 kg)   Height: 6' (1.829 m)       Results for orders placed or performed during the hospital encounter of 03/10/22   ECHO ADULT COMPLETE   Result Value Ref Range    IVSd 1.7 (A) 0.6 - 1.0 cm    LVIDd 4.0 (A) 4.2 - 5.9 cm    LVIDs 3.0 cm    LVOT Diameter 1.9 cm    LVPWd 1.7 (A) 0.6 - 1.0 cm    LV Ejection Fraction A4C 41 %    LV EDV A4C 133 mL    LV ESV A4C 78 mL    LVOT Peak Gradient 5 mmHg    LVOT Peak Velocity 1.1 m/s    RVSP 58 mmHg    LA Diameter 3.6 cm    LA Volume 4C 61 (A) 18 - 58 mL    Est. RA Pressure 10 mmHg    AV Area by Peak Velocity 2.7 cm2    AV Area by Peak Velocity 2.7 cm2    AR .6 millisecond    AR Max Velocity PISA 1.9 m/s    AV Peak Gradient 5 mmHg    AV Mean Gradient 3 mmHg    AV Peak Velocity 1.2 m/s    AV Mean Velocity 0.9 m/s    AV VTI 17.6 cm    MV A Velocity 1.08 m/s    MV E Wave Deceleration Time 107.7 ms    MV E Velocity 0.91 m/s    LV E' Lateral Velocity 10 cm/s    MR Peak Gradient 121 mmHg    MR Peak Velocity 5.5 m/s    MV Peak Gradient 6 mmHg    MV Mean Gradient 3 mmHg    MV Max Velocity 1.2 m/s    MV Mean Velocity 0.8 m/s    MV VTI 18.2 cm    Pulmonary Artery EDP 11 mmHg    AR Max Velocity 1.7 m/s    PV Peak Gradient 6 mmHg    PV Max Velocity 1.3 m/s    TR Peak Gradient 48 mmHg TR Max Velocity 3.46 m/s    Aortic Root 3.4 cm    Fractional Shortening 2D 25 28 - 44 %    LV ESV Index A4C 30 mL/m2    LV EDV Index A4C 52 mL/m2    LVIDd Index 1.56 cm/m2    LVIDs Index 1.17 cm/m2    LV RWT Ratio 0.85     LV Mass 2D 284.5 (A) 88 - 224 g    LV Mass 2D Index 111.1 49 - 115 g/m2    MV E/A 0.84     E/E' Lateral 9.10     LVOT Area 2.8 cm2    LA Volume Index 4C 24 16 - 34 mL/m2    LA Size Index 1.41 cm/m2    LA/AO Root Ratio 1.06     Ao Root Index 1.33 cm/m2    AV Velocity Ratio 0.92          Physical Examination: General appearance - alert, well appearing, and in no distress  Mental status - alert, oriented to person, place, and time  Chest - clear to auscultation, no wheezes, rales or rhonchi, symmetric air entry  Heart - normal rate, regular rhythm, normal S1, S2, no murmurs, rubs, clicks or gallops  Extremities - +2 bilat pitting edema  No cellulitis     Assessment/ Plan:     He is advised to check BP at work, some barriers to coming into office- transportation    1. Chronic systolic congestive heart failure (HCC)  Add beta blocker and lasix, discussed diet and high K foods    - metoprolol succinate (TOPROL-XL) 25 mg XL tablet; Take 1 Tablet by mouth daily. Dispense: 90 Tablet; Refill: 0  - furosemide (LASIX) 20 mg tablet; Take 1 Tablet by mouth daily. Additional fluid pill  Dispense: 90 Tablet; Refill: 0  - REFERRAL TO CARDIOLOGY    2. Essential hypertension    - metoprolol succinate (TOPROL-XL) 25 mg XL tablet; Take 1 Tablet by mouth daily. Dispense: 90 Tablet; Refill: 0  - furosemide (LASIX) 20 mg tablet; Take 1 Tablet by mouth daily. Additional fluid pill  Dispense: 90 Tablet; Refill: 0  - lisinopril-hydroCHLOROthiazide (PRINZIDE, ZESTORETIC) 10-12.5 mg per tablet; Take 1 Tablet by mouth daily. Dispense: 90 Tablet; Refill: 0    3. SIGIFREDO (obstructive sleep apnea)      4.  Prediabetes  Discussed diet, start meds INI next mo      I have discussed the diagnosis with the patient and the intended plan as seen in the above orders. The patient has received an after-visit summary and questions were answered concerning future plans. Pt conveyed understanding of plan. Medication Side Effects and Warnings were discussed with patient: yes  Patient Labs were reviewed: yes  Patient Past Records were reviewed:  yes    Matilde Ghosh.  Sultana Alva NP

## 2022-04-12 ENCOUNTER — DOCUMENTATION ONLY (OUTPATIENT)
Dept: SLEEP MEDICINE | Age: 57
End: 2022-04-12

## 2022-04-12 NOTE — TELEPHONE ENCOUNTER
Han Olivia is to be contacted by lead sleep technologist regarding results of Sleep Testing which was indicative of an average AHI of 101 per hour with an SpO2 misael of 53% . Average ETCO2 during Sleep:        54  mmHg  Highest ETCO2 value during TIB:           69 mmHg  PCO2 > 50 mmHg during TST:               266  Minutes                          * A second polysomnogram was ordered for mask fitting, Bi-Level PAP desensitizing and Bi-Level PAP titration protocol. Patient should be educated on the risks versus benefits of this elective sleep testing procedure being done during the pandemic and their consent be obtained. * Follow-up appointment will be scheduled 8-12 weeks following PAP initiation to gauge treatment response and compliance. Encounter Diagnoses   Name Primary?     SIGIFREDO (obstructive sleep apnea) Yes    Hypoventilation associated with obesity syndrome ([facility] [place] [address]        Orders Placed This Encounter    SLEEP LAB (PAP TITRATION)     Standing Status:   Future     Standing Expiration Date:   10/12/2022     Scheduling Instructions:      Perform ETCO2 monitoring during Polysomnography     Order Specific Question:   Reason for Exam     Answer:   SIGIFREDO / OHS

## 2022-04-14 ENCOUNTER — TELEPHONE (OUTPATIENT)
Dept: SLEEP MEDICINE | Age: 57
End: 2022-04-14

## 2022-04-18 NOTE — TELEPHONE ENCOUNTER
Reviewed sleep study results with patient. He expressed understanding and is willing to proceed with a Bi-Level Titration. Please schedule titration study.     Thanks

## 2022-05-17 ENCOUNTER — TELEPHONE (OUTPATIENT)
Dept: SLEEP MEDICINE | Age: 57
End: 2022-05-17

## 2022-05-17 ENCOUNTER — HOSPITAL ENCOUNTER (OUTPATIENT)
Dept: SLEEP MEDICINE | Age: 57
Discharge: HOME | End: 2022-05-17
Payer: COMMERCIAL

## 2022-05-17 ENCOUNTER — DOCUMENTATION ONLY (OUTPATIENT)
Dept: SLEEP MEDICINE | Age: 57
End: 2022-05-17

## 2022-05-17 VITALS
WEIGHT: 289 LBS | SYSTOLIC BLOOD PRESSURE: 168 MMHG | HEIGHT: 72 IN | HEART RATE: 80 BPM | BODY MASS INDEX: 39.14 KG/M2 | DIASTOLIC BLOOD PRESSURE: 102 MMHG | TEMPERATURE: 98.2 F | OXYGEN SATURATION: 88 %

## 2022-05-17 DIAGNOSIS — G47.33 OSA (OBSTRUCTIVE SLEEP APNEA): ICD-10-CM

## 2022-05-17 DIAGNOSIS — G47.33 OSA (OBSTRUCTIVE SLEEP APNEA): Primary | ICD-10-CM

## 2022-05-17 DIAGNOSIS — E66.2 HYPOVENTILATION ASSOCIATED WITH OBESITY SYNDROME (HCC): ICD-10-CM

## 2022-05-17 PROCEDURE — 95811 POLYSOM 6/>YRS CPAP 4/> PARM: CPT | Performed by: INTERNAL MEDICINE

## 2022-05-18 NOTE — PROGRESS NOTES
[address] [place]. [place], [address]  Tel.  [phone]  Fax. [address] [facility] 60  [place], [address] [facility]  Tel.  [phone]  Fax. [phone] [address] [facility] 3 Romel Bedolla   Tel.  [phone]  Fax. [phone]     Sleep Study Technical Notes        PRE-Test:  · Erica Tanner (: 1965) arrived on time. Vitals taken: temperature (98.2 ) BP (168/102) SpO2 (88%) HR (80). He was shown directly to his room. Paperwork completed. He is here for a Bi-Level Titration study. Procedure was explained to the patient and questions were answered. He expressed understanding. Mask desensitization with a full face mask was performed. Electrodes were applied without incident. The full face mask was applied. Once settled in bed, the study was started. Acquisition Notes:  · Lights off: 9:48pm  · Sleep onset: 9:57pm  · Respiratory events: hypopnea, obstructive  · ECG:  Frequent PVC's  · PAP titration: Bi-Level  · Mask(s) Used: [employer] full face mask - large  · Other comments:   · Extra pillows were provided  · The head of the bed was raised slightly for patient comfort. · Two bathroom breaks  · The patient was unable to return to sleep after the second bathroom break  · Study ended at 5:30am for his cab ride home at 6am.  · Final Pressure:  Bi-Level 16/10cm, Easy Breathe=On, No Humidity    POST Test:  Patient awakened. Mask and electrodes were removed. Post Sleep Questionnaire completed. Patient is being transported home in a cab. Equipment and room cleaned per infection control policy.

## 2022-05-26 ENCOUNTER — DOCUMENTATION ONLY (OUTPATIENT)
Dept: SLEEP MEDICINE | Age: 57
End: 2022-05-26

## 2022-05-26 NOTE — TELEPHONE ENCOUNTER
Pap order faxed to [address] - [facility] HEDY CALIXTO on 05/26/2022.

## 2022-05-26 NOTE — TELEPHONE ENCOUNTER
Orders Placed This Encounter    AMB SUPPLY ORDER     Diagnosis: (G47.33) SIGIFREDO (obstructive sleep apnea)  (primary encounter diagnosis)     Positive Airway Pressure Therapy: Duration of need: 99 months. Auto Bi-Level Unit (): Maximum IPAP: 20 cmH2O; Minimum EPAP: 10 cmH2O; PS: 06 cmH2O. Ramp Time: 30 Minutes; Remote monitoring enrollment.  Heated Humidifier    CPAP mask -  As fitted during titration OR patient preference, headgear, tubing, and filter;  heated humidifier; wireless modem. Bladimir Apple MD, FAASM; NPI: [id number]  Electronically signed. 05/25/22

## 2022-07-06 ENCOUNTER — TELEPHONE (OUTPATIENT)
Dept: SLEEP MEDICINE | Age: 57
End: 2022-07-06

## 2022-07-06 NOTE — TELEPHONE ENCOUNTER
Patient called into the office on 07/06/2022 at 3:15pm to check on the status of his Bipap device from [facility]. I reached out to Autumn Ga at [facility] and they have him on the urgent list however they do not have any devices on hand.

## 2022-07-18 DIAGNOSIS — I50.22 CHRONIC SYSTOLIC CONGESTIVE HEART FAILURE (HCC): ICD-10-CM

## 2022-07-18 DIAGNOSIS — I10 ESSENTIAL HYPERTENSION: ICD-10-CM

## 2022-07-18 RX ORDER — METOPROLOL SUCCINATE 25 MG/1
25 TABLET, EXTENDED RELEASE ORAL DAILY
Qty: 90 TABLET | Refills: 0 | Status: SHIPPED | OUTPATIENT
Start: 2022-07-18 | End: 2022-08-18 | Stop reason: SDUPTHER

## 2022-07-18 RX ORDER — FUROSEMIDE 20 MG/1
20 TABLET ORAL DAILY
Qty: 90 TABLET | Refills: 0 | Status: SHIPPED | OUTPATIENT
Start: 2022-07-18 | End: 2022-10-14

## 2022-08-18 ENCOUNTER — OFFICE VISIT (OUTPATIENT)
Dept: INTERNAL MEDICINE CLINIC | Age: 57
End: 2022-08-18
Payer: COMMERCIAL

## 2022-08-18 VITALS
HEIGHT: 72 IN | SYSTOLIC BLOOD PRESSURE: 160 MMHG | OXYGEN SATURATION: 91 % | HEART RATE: 88 BPM | BODY MASS INDEX: 39.88 KG/M2 | TEMPERATURE: 98.2 F | WEIGHT: 294.4 LBS | RESPIRATION RATE: 16 BRPM | DIASTOLIC BLOOD PRESSURE: 110 MMHG

## 2022-08-18 DIAGNOSIS — R73.09 ELEVATED GLUCOSE: ICD-10-CM

## 2022-08-18 DIAGNOSIS — G47.33 OBSTRUCTIVE SLEEP APNEA SYNDROME: ICD-10-CM

## 2022-08-18 DIAGNOSIS — I10 ESSENTIAL HYPERTENSION: Primary | ICD-10-CM

## 2022-08-18 DIAGNOSIS — I50.22 CHRONIC SYSTOLIC CONGESTIVE HEART FAILURE (HCC): ICD-10-CM

## 2022-08-18 DIAGNOSIS — Z12.11 COLON CANCER SCREENING: ICD-10-CM

## 2022-08-18 PROCEDURE — 99214 OFFICE O/P EST MOD 30 MIN: CPT | Performed by: NURSE PRACTITIONER

## 2022-08-18 RX ORDER — LISINOPRIL AND HYDROCHLOROTHIAZIDE 20; 25 MG/1; MG/1
1 TABLET ORAL DAILY
Qty: 90 TABLET | Refills: 0 | Status: SHIPPED | OUTPATIENT
Start: 2022-08-18 | End: 2022-10-17 | Stop reason: SDUPTHER

## 2022-08-18 RX ORDER — ATORVASTATIN CALCIUM 10 MG/1
10 TABLET, FILM COATED ORAL
Qty: 90 TABLET | Refills: 0 | Status: SHIPPED | OUTPATIENT
Start: 2022-08-18

## 2022-08-18 RX ORDER — METOPROLOL SUCCINATE 50 MG/1
50 TABLET, EXTENDED RELEASE ORAL DAILY
Qty: 90 TABLET | Refills: 0 | Status: SHIPPED | OUTPATIENT
Start: 2022-08-18 | End: 2022-10-17 | Stop reason: SDUPTHER

## 2022-08-18 RX ORDER — POTASSIUM CHLORIDE 750 MG/1
10 TABLET, EXTENDED RELEASE ORAL DAILY
Qty: 90 TABLET | Refills: 0 | Status: SHIPPED | OUTPATIENT
Start: 2022-08-18

## 2022-08-18 NOTE — PROGRESS NOTES
Subjective: (As above and below)     Chief Complaint   Patient presents with    Follow-up     BP follow up     Farheen Apodaca Sofia Tyler is a 61y.o. year old male who presents for     Hypertension ROS:  taking medications as instructed, no medication side effects noted, no TIAs, no chest pain on exertion, no dyspnea on exertion, no swelling of ankles  BP Readings from Last 3 Encounters:   08/18/22 (!) 160/110   05/17/22 (!) 168/102   04/11/22 (!) 172/108        Sleep apnea; severe s/s, needs bipap in communication w/ sleep med but delay in getting machine    CHF: appt w/ cardiology next month  He has bilateral LE edema, has compression stockings but not wearing  He is trying to reduce sodium, but room for improvement (ate chicken mauro and deli wrap yesterday)  No cp, some TREVIZO    Wt Readings from Last 3 Encounters:   08/18/22 294 lb 6.4 oz (133.5 kg)   05/17/22 289 lb (131.1 kg)   04/11/22 289 lb (131.1 kg)         Cologuard: previously ordered, not done        Reviewed PmHx, RxHx, FmHx, SocHx, AllgHx and updated in chart. Family History   Family history unknown: Yes       Past Medical History:   Diagnosis Date    Hypertension       Social History     Socioeconomic History    Marital status: SINGLE   Tobacco Use    Smoking status: Former    Smokeless tobacco: Never   Vaping Use    Vaping Use: Never used   Substance and Sexual Activity    Alcohol use: Yes     Alcohol/week: 15.0 standard drinks     Types: 10 Cans of beer, 5 Shots of liquor per week     Comment: pt states he is unable to give good estimate but does drink a lot     Drug use: Not Currently    Sexual activity: Not Currently     Partners: Female          Current Outpatient Medications   Medication Sig    atorvastatin (LIPITOR) 10 mg tablet Take 1 Tablet by mouth nightly. lisinopril-hydroCHLOROthiazide (PRINZIDE, ZESTORETIC) 20-25 mg per tablet Take 1 Tablet by mouth daily. potassium chloride (KLOR-CON M10) 10 mEq tablet Take 1 Tablet by mouth daily. metoprolol succinate (TOPROL-XL) 50 mg XL tablet Take 1 Tablet by mouth daily. furosemide (LASIX) 20 mg tablet Take 1 Tablet by mouth daily. Additional fluid pill     No current facility-administered medications for this visit. Review of Systems:   Constitutional:    Negative for fever and chills, negative diaphoresis. HEENT:              Negative for neck pain and stiffness. Eyes:                  Negative for visual disturbance, itching, redness or discharge. Respiratory:        Negative for cough and shortness of breath. Cardiovascular:  Negative for chest pain and palpitations. Gastrointestinal: Negative for nausea, vomiting, abdominal pain, diarrhea or constipation. Genitourinary:     Negative for dysuria and frequency. Musculoskeletal: Negative for falls, tenderness and swelling. Skin:                    Negative for rash, masses or lesions. Neurological:       Negative for dizzyness, seizure, loss of consciousness, weakness and numbness.      Objective:     Vitals:    08/18/22 1041 08/18/22 1051 08/18/22 1112   BP: (!) 161/112 (!) 160/110    Pulse: 88     Resp: 16     Temp: 98.2 °F (36.8 °C)     SpO2: (!) 87%  91%   Weight: 294 lb 6.4 oz (133.5 kg)     Height: 6' (1.829 m)         Results for orders placed or performed during the hospital encounter of 03/10/22   ECHO ADULT COMPLETE   Result Value Ref Range    IVSd 1.7 (A) 0.6 - 1.0 cm    LVIDd 4.0 (A) 4.2 - 5.9 cm    LVIDs 3.0 cm    LVOT Diameter 1.9 cm    LVPWd 1.7 (A) 0.6 - 1.0 cm    LV Ejection Fraction A4C 41 %    LV EDV A4C 133 mL    LV ESV A4C 78 mL    LVOT Peak Gradient 5 mmHg    LVOT Peak Velocity 1.1 m/s    RVSP 58 mmHg    LA Diameter 3.6 cm    LA Volume 4C 61 (A) 18 - 58 mL    Est. RA Pressure 10 mmHg    AV Area by Peak Velocity 2.7 cm2    AV Area by Peak Velocity 2.7 cm2    AR .6 millisecond    AR Max Velocity PISA 1.9 m/s    AV Peak Gradient 5 mmHg    AV Mean Gradient 3 mmHg    AV Peak Velocity 1.2 m/s    AV Mean Velocity 0.9 m/s    AV VTI 17.6 cm    MV A Velocity 1.08 m/s    MV E Wave Deceleration Time 107.7 ms    MV E Velocity 0.91 m/s    LV E' Lateral Velocity 10 cm/s    MR Peak Gradient 121 mmHg    MR Peak Velocity 5.5 m/s    MV Peak Gradient 6 mmHg    MV Mean Gradient 3 mmHg    MV Max Velocity 1.2 m/s    MV Mean Velocity 0.8 m/s    MV VTI 18.2 cm    Pulmonary Artery EDP 11 mmHg    SC Max Velocity 1.7 m/s    PV Peak Gradient 6 mmHg    PV Max Velocity 1.3 m/s    TR Peak Gradient 48 mmHg    TR Max Velocity 3.46 m/s    Aortic Root 3.4 cm    Fractional Shortening 2D 25 28 - 44 %    LV ESV Index A4C 30 mL/m2    LV EDV Index A4C 52 mL/m2    LVIDd Index 1.56 cm/m2    LVIDs Index 1.17 cm/m2    LV RWT Ratio 0.85     LV Mass 2D 284.5 (A) 88 - 224 g    LV Mass 2D Index 111.1 49 - 115 g/m2    MV E/A 0.84     E/E' Lateral 9.10     LVOT Area 2.8 cm2    LA Volume Index 4C 24 16 - 34 mL/m2    LA Size Index 1.41 cm/m2    LA/AO Root Ratio 1.06     Ao Root Index 1.33 cm/m2    AV Velocity Ratio 0.92          Physical Examination: General appearance - alert, well appearing, and in no distress and overweight  Mental status - alert, oriented to person, place, and time  Chest - clear to auscultation, no wheezes, rales or rhonchi, symmetric air entry  Heart - normal rate, regular rhythm, normal S1, S2, no murmurs, rubs, clicks or gallops  Extremities - +1 bilat LLE     Assessment/ Plan:     Follow-up and Dispositions    Return in about 1 week (around 8/25/2022) for nv bp check. 1. Essential hypertension  Discussed diet, recc compression stockings  Increase med  Discussed need for close fu    - METABOLIC PANEL, BASIC  - atorvastatin (LIPITOR) 10 mg tablet; Take 1 Tablet by mouth nightly. Dispense: 90 Tablet; Refill: 0  - lisinopril-hydroCHLOROthiazide (PRINZIDE, ZESTORETIC) 20-25 mg per tablet; Take 1 Tablet by mouth daily. Dispense: 90 Tablet; Refill: 0  - potassium chloride (KLOR-CON M10) 10 mEq tablet; Take 1 Tablet by mouth daily. Dispense: 90 Tablet; Refill: 0  - metoprolol succinate (TOPROL-XL) 50 mg XL tablet; Take 1 Tablet by mouth daily. Dispense: 90 Tablet; Refill: 0    2. Elevated glucose    - HEMOGLOBIN A1C WITH EAG    3. Obstructive sleep apnea syndrome  Hopefully he gets bipap SOON    4. Chronic systolic congestive heart failure (HCC)    - atorvastatin (LIPITOR) 10 mg tablet; Take 1 Tablet by mouth nightly. Dispense: 90 Tablet; Refill: 0  - metoprolol succinate (TOPROL-XL) 50 mg XL tablet; Take 1 Tablet by mouth daily. Dispense: 90 Tablet; Refill: 0    5. Colon cancer screening    - COLOGUARD TEST (FECAL DNA COLORECTAL CANCER SCREENING)        I have discussed the diagnosis with the patient and the intended plan as seen in the above orders. The patient has received an after-visit summary and questions were answered concerning future plans. Pt conveyed understanding of plan. Medication Side Effects and Warnings were discussed with patient: yes  Patient Labs were reviewed: yes  Patient Past Records were reviewed:  yes    Josh Booker.  Juan C Amado NP

## 2022-08-18 NOTE — PROGRESS NOTES
Lela Zhu is a 61 y.o. male    Chief Complaint   Patient presents with    Follow-up     BP follow up       Visit Vitals  BP (!) 160/110 Comment: Manual   Pulse 88   Temp 98.2 °F (36.8 °C)   Resp 16   Ht 6' (1.829 m)   Wt 294 lb 6.4 oz (133.5 kg)   SpO2 (!) 87%   BMI 39.93 kg/m²           1. Have you been to the ER, urgent care clinic since your last visit? Hospitalized since your last visit? NO    2. Have you seen or consulted any other health care providers outside of the [address] [place] [address] since your last visit? Include any pap smears or colon screening.  NO

## 2022-08-19 LAB
BUN SERPL-MCNC: 26 MG/DL (ref 6–24)
BUN/CREAT SERPL: 21 (ref 9–20)
CALCIUM SERPL-MCNC: 8.9 MG/DL (ref 8.7–10.2)
CHLORIDE SERPL-SCNC: 93 MMOL/L (ref 96–106)
CO2 SERPL-SCNC: 29 MMOL/L (ref 20–29)
CREAT SERPL-MCNC: 1.21 MG/DL (ref 0.76–1.27)
EGFR: 69 ML/MIN/1.73
EST. AVERAGE GLUCOSE BLD GHB EST-MCNC: 134 MG/DL
GLUCOSE SERPL-MCNC: 125 MG/DL (ref 65–99)
HBA1C MFR BLD: 6.3 % (ref 4.8–5.6)
POTASSIUM SERPL-SCNC: 4 MMOL/L (ref 3.5–5.2)
SODIUM SERPL-SCNC: 139 MMOL/L (ref 134–144)

## 2022-08-29 ENCOUNTER — CLINICAL SUPPORT (OUTPATIENT)
Dept: INTERNAL MEDICINE CLINIC | Age: 57
End: 2022-08-29

## 2022-08-29 VITALS
HEART RATE: 80 BPM | SYSTOLIC BLOOD PRESSURE: 146 MMHG | HEIGHT: 72 IN | RESPIRATION RATE: 20 BRPM | DIASTOLIC BLOOD PRESSURE: 86 MMHG | OXYGEN SATURATION: 83 % | TEMPERATURE: 98.4 F | WEIGHT: 296 LBS | BODY MASS INDEX: 40.09 KG/M2

## 2022-08-29 DIAGNOSIS — Z01.30 BP CHECK: Primary | ICD-10-CM

## 2022-08-29 DIAGNOSIS — R79.81 LOW OXYGEN SATURATION: ICD-10-CM

## 2022-08-29 NOTE — PROGRESS NOTES
Chief Complaint   Patient presents with    Blood Pressure Check       Visit Vitals  BP (!) 146/86 (BP 1 Location: Left upper arm, BP Patient Position: Sitting, BP Cuff Size: Large adult)   Pulse 80   Temp 98.4 °F (36.9 °C) (Temporal)   Resp 20   Ht 6' (1.829 m)   Wt 296 lb (134.3 kg)   SpO2 (!) 83%   BMI 40.14 kg/m²         Provider notified, no changes made.  Referral given to pulm for o2 stat, portable o2 will be ordered, pt needs to contact dr for CPAP

## 2022-09-19 PROBLEM — G47.33 OBSTRUCTIVE SLEEP APNEA SYNDROME: Status: ACTIVE | Noted: 2022-09-19

## 2022-10-13 ENCOUNTER — TRANSCRIBE ORDER (OUTPATIENT)
Dept: SCHEDULING | Age: 57
End: 2022-10-13

## 2022-10-13 DIAGNOSIS — R06.02 SHORTNESS OF BREATH: Primary | ICD-10-CM

## 2022-10-17 DIAGNOSIS — I10 ESSENTIAL HYPERTENSION: ICD-10-CM

## 2022-10-17 DIAGNOSIS — I50.22 CHRONIC SYSTOLIC CONGESTIVE HEART FAILURE (HCC): ICD-10-CM

## 2022-10-17 RX ORDER — METOPROLOL SUCCINATE 50 MG/1
50 TABLET, EXTENDED RELEASE ORAL DAILY
Qty: 90 TABLET | Refills: 0 | Status: SHIPPED | OUTPATIENT
Start: 2022-10-17

## 2022-10-17 RX ORDER — LISINOPRIL AND HYDROCHLOROTHIAZIDE 20; 25 MG/1; MG/1
1 TABLET ORAL DAILY
Qty: 90 TABLET | Refills: 0 | Status: SHIPPED | OUTPATIENT
Start: 2022-10-17

## 2022-10-17 RX ORDER — FUROSEMIDE 20 MG/1
20 TABLET ORAL DAILY
Qty: 90 TABLET | Refills: 0 | Status: SHIPPED | OUTPATIENT
Start: 2022-10-17

## 2022-10-17 NOTE — TELEPHONE ENCOUNTER
Pt states has one more week of medications and will run out before his appt with you.   Pt # [phone]

## 2022-11-07 ENCOUNTER — OFFICE VISIT (OUTPATIENT)
Dept: INTERNAL MEDICINE CLINIC | Age: 57
End: 2022-11-07
Payer: COMMERCIAL

## 2022-11-07 VITALS
HEIGHT: 72 IN | RESPIRATION RATE: 19 BRPM | WEIGHT: 297 LBS | HEART RATE: 83 BPM | SYSTOLIC BLOOD PRESSURE: 162 MMHG | DIASTOLIC BLOOD PRESSURE: 97 MMHG | OXYGEN SATURATION: 92 % | BODY MASS INDEX: 40.23 KG/M2 | TEMPERATURE: 98.6 F

## 2022-11-07 DIAGNOSIS — S67.21XA CRUSHING INJURY OF RIGHT HAND, INITIAL ENCOUNTER: ICD-10-CM

## 2022-11-07 DIAGNOSIS — I10 ESSENTIAL HYPERTENSION: Primary | ICD-10-CM

## 2022-11-07 DIAGNOSIS — I50.22 CHRONIC SYSTOLIC CONGESTIVE HEART FAILURE (HCC): ICD-10-CM

## 2022-11-07 DIAGNOSIS — G47.33 OBSTRUCTIVE SLEEP APNEA SYNDROME: ICD-10-CM

## 2022-11-07 DIAGNOSIS — E78.2 MIXED HYPERLIPIDEMIA: ICD-10-CM

## 2022-11-07 PROCEDURE — 99214 OFFICE O/P EST MOD 30 MIN: CPT | Performed by: NURSE PRACTITIONER

## 2022-11-07 RX ORDER — ATORVASTATIN CALCIUM 10 MG/1
10 TABLET, FILM COATED ORAL
Qty: 90 TABLET | Refills: 0 | Status: SHIPPED | OUTPATIENT
Start: 2022-11-07

## 2022-11-07 RX ORDER — METOPROLOL SUCCINATE 100 MG/1
100 TABLET, EXTENDED RELEASE ORAL DAILY
Qty: 90 TABLET | Refills: 1 | Status: SHIPPED | OUTPATIENT
Start: 2022-11-07

## 2022-11-07 RX ORDER — POTASSIUM CHLORIDE 750 MG/1
10 TABLET, EXTENDED RELEASE ORAL DAILY
Qty: 90 TABLET | Refills: 0 | Status: SHIPPED | OUTPATIENT
Start: 2022-11-07

## 2022-11-07 RX ORDER — NAPROXEN SODIUM 550 MG/1
TABLET ORAL
COMMUNITY
Start: 2022-10-12

## 2022-11-07 NOTE — PROGRESS NOTES
Subjective: (As above and below)     Chief Complaint   Patient presents with    Follow-up     Pt states BP is not coming now     Lyric Banks. Wilbert Salinas is a 61y.o. year old male who presents for     Hypertension ROS:  taking medications as instructed, no medication side effects noted, no TIAs, no chest pain on exertion, no dyspnea on exertion, + bilat LLE, improved fro before  Diet needs improvement    BP Readings from Last 3 Encounters:   11/07/22 (!) 162/97   08/29/22 (!) 146/86   08/18/22 (!) 160/110      Hyperlipidemia tolerating statin    Suspected sleep apnea: he got cpap and feels it is helping    TREVIZO: saw pulm, per pt sob believed to be cardiac  Pt w/ CHF, needs to r/s missed cardio appt      ETOH: what seems like a rocks glass of voka per night      Cologuard' has at home, states he will complete    He is following w/ workers comp due to crushing R hand injury involving conveyor belt,    Reviewed PmHx, RxHx, FmHx, SocHx, AllgHx and updated in chart. Family History   Family history unknown: Yes       Past Medical History:   Diagnosis Date    Hypertension       Social History     Socioeconomic History    Marital status: SINGLE   Tobacco Use    Smoking status: Former    Smokeless tobacco: Never   Vaping Use    Vaping Use: Never used   Substance and Sexual Activity    Alcohol use: Yes     Alcohol/week: 15.0 standard drinks     Types: 10 Cans of beer, 5 Shots of liquor per week     Comment: pt states he is unable to give good estimate but does drink a lot     Drug use: Not Currently    Sexual activity: Not Currently     Partners: Female          Current Outpatient Medications   Medication Sig    naproxen sodium (ANAPROX) 550 mg tablet TAKE 1 TABLET EVERY 12 HOURS AS NEEDED FOR PAIN    atorvastatin (LIPITOR) 10 mg tablet Take 1 Tablet by mouth nightly. potassium chloride (KLOR-CON M10) 10 mEq tablet Take 1 Tablet by mouth daily. furosemide (LASIX) 20 mg tablet Take 1 Tablet by mouth daily.  Additional fluid pill    lisinopril-hydroCHLOROthiazide (PRINZIDE, ZESTORETIC) 20-25 mg per tablet Take 1 Tablet by mouth daily. metoprolol succinate (TOPROL-XL) 50 mg XL tablet Take 1 Tablet by mouth daily. No current facility-administered medications for this visit. Review of Systems:   Constitutional:    Negative for fever and chills, negative diaphoresis. HEENT:              Negative for neck pain and stiffness. Eyes:                  Negative for visual disturbance, itching, redness or discharge. Respiratory:        Negative for cough and shortness of breath. Cardiovascular:  Negative for chest pain and palpitations. Gastrointestinal: Negative for nausea, vomiting, abdominal pain, diarrhea or constipation. Genitourinary:     Negative for dysuria and frequency. Musculoskeletal: Negative for falls, tenderness and swelling. Skin:                    Negative for rash, masses or lesions. Neurological:       Negative for dizzyness, seizure, loss of consciousness, weakness and numbness. Objective:     Vitals:    11/07/22 0916 11/07/22 0947   BP: (!) 162/98 (!) 162/97   Pulse: 84 83   Resp: 19    Temp: 98.6 °F (37 °C)    TempSrc: Temporal    SpO2: 92%    Weight: 297 lb (134.7 kg)    Height: 6' (1.829 m)        Results for orders placed or performed in visit on 03/27/66   METABOLIC PANEL, BASIC   Result Value Ref Range    Glucose 125 (H) 65 - 99 mg/dL    BUN 26 (H) 6 - 24 mg/dL    Creatinine 1.21 0.76 - 1.27 mg/dL    eGFR 69 >59 mL/min/1.73    BUN/Creatinine ratio 21 (H) 9 - 20    Sodium 139 134 - 144 mmol/L    Potassium 4.0 3.5 - 5.2 mmol/L    Chloride 93 (L) 96 - 106 mmol/L    CO2 29 20 - 29 mmol/L    Calcium 8.9 8.7 - 10.2 mg/dL   HEMOGLOBIN A1C WITH EAG   Result Value Ref Range    Hemoglobin A1c 6.3 (H) 4.8 - 5.6 %    Estimated average glucose 134 mg/dL       Gen: Oriented to person, place and time and well-developed, well-nourished and in no distress. HEENT:    Head: normocephalic and atraumatic. Eyes:  EOM are normal. Pupils equal and round. Neck:  Normal range of motion. Neck supple. Cardiovascular: normal rate, regular rhythm and normal heart sounds. Pulmonary/Chest:  Effort normal and breath sounds normal.  No respiratory distress. No wheezes, no rales. Abdominal: soft, normal  bowel sounds. Musculoskeletal:  No edema, no tenderness. No calf tenderness or edema. Neurological:  Alert, oriented to person, place and time. Skin: skin is warm and dry. Assessment/ Plan:     Follow-up and Dispositions    Return in about 3 weeks (around 11/28/2022) for nv bp check then 6 mo w me. 1. Essential hypertension  Increase metoprolol, fu cardio  - atorvastatin (LIPITOR) 10 mg tablet; Take 1 Tablet by mouth nightly. Dispense: 90 Tablet; Refill: 0  - potassium chloride (KLOR-CON M10) 10 mEq tablet; Take 1 Tablet by mouth daily. Dispense: 90 Tablet; Refill: 0  - metoprolol succinate (TOPROL-XL) 100 mg tablet; Take 1 Tablet by mouth daily. Dispense: 90 Tablet; Refill: 1    2. Chronic systolic congestive heart failure (HCC)    - atorvastatin (LIPITOR) 10 mg tablet; Take 1 Tablet by mouth nightly. Dispense: 90 Tablet; Refill: 0  - metoprolol succinate (TOPROL-XL) 100 mg tablet; Take 1 Tablet by mouth daily. Dispense: 90 Tablet; Refill: 1    3. Mixed hyperlipidemia      4. Obstructive sleep apnea syndrome      5. Crushing injury of right hand, initial encounter  Fu hand dx, workers comp        I have discussed the diagnosis with the patient and the intended plan as seen in the above orders. The patient has received an after-visit summary and questions were answered concerning future plans. Pt conveyed understanding of plan. Medication Side Effects and Warnings were discussed with patient: yes  Patient Labs were reviewed: yes  Patient Past Records were reviewed:  yes    Stephanie Smallwood.  Amanda Lieberman NP

## 2022-11-07 NOTE — PROGRESS NOTES
Chief Complaint   Patient presents with    Follow-up       1. \"Have you been to the ER, urgent care clinic since your last visit? Hospitalized since your last visit? \" No    2. \"Have you seen or consulted any other health care providers outside of the [address] [place] [address] since your last visit? \" No     3. For patients aged 39-70: Has the patient had a colonoscopy / FIT/ Cologuard? No      If the patient is female:    4. For patients aged 41-77: Has the patient had a mammogram within the past 2 years? NA - based on age or sex      11. For patients aged 21-65: Has the patient had a pap smear?  NA - based on age or sex

## 2023-01-17 DIAGNOSIS — M25.562 LEFT KNEE PAIN, UNSPECIFIED CHRONICITY: Primary | ICD-10-CM

## 2023-01-18 ENCOUNTER — OFFICE VISIT (OUTPATIENT)
Dept: ORTHOPEDIC SURGERY | Age: 58
End: 2023-01-18
Payer: COMMERCIAL

## 2023-01-18 ENCOUNTER — HOSPITAL ENCOUNTER (OUTPATIENT)
Dept: GENERAL RADIOLOGY | Age: 58
Discharge: HOME | End: 2023-01-18
Payer: COMMERCIAL

## 2023-01-18 VITALS
SYSTOLIC BLOOD PRESSURE: 175 MMHG | DIASTOLIC BLOOD PRESSURE: 119 MMHG | HEIGHT: 72 IN | HEART RATE: 68 BPM | WEIGHT: 303 LBS | BODY MASS INDEX: 41.04 KG/M2 | OXYGEN SATURATION: 95 % | TEMPERATURE: 97.9 F

## 2023-01-18 DIAGNOSIS — M25.562 LEFT KNEE PAIN, UNSPECIFIED CHRONICITY: ICD-10-CM

## 2023-01-18 DIAGNOSIS — M17.12 UNILATERAL PRIMARY OSTEOARTHRITIS, LEFT KNEE: Primary | ICD-10-CM

## 2023-01-18 PROCEDURE — 73564 X-RAY EXAM KNEE 4 OR MORE: CPT

## 2023-01-18 RX ORDER — BETAMETHASONE SODIUM PHOSPHATE AND BETAMETHASONE ACETATE 3; 3 MG/ML; MG/ML
6 INJECTION, SUSPENSION INTRA-ARTICULAR; INTRALESIONAL; INTRAMUSCULAR; SOFT TISSUE ONCE
Status: COMPLETED | OUTPATIENT
Start: 2023-01-18 | End: 2023-01-18

## 2023-01-18 RX ADMIN — BETAMETHASONE SODIUM PHOSPHATE AND BETAMETHASONE ACETATE 6 MG: 3; 3 INJECTION, SUSPENSION INTRA-ARTICULAR; INTRALESIONAL; INTRAMUSCULAR; SOFT TISSUE at 09:07

## 2023-01-18 NOTE — PROGRESS NOTES
1/18/2023    Chief Complaint: Left knee pain    Assessment: Osteoarthritis left knee    Plan: This patient and I did discuss the many options in treating knee osteoarthritis. We did discuss that we could continue to seek out nonoperative modalities, such as: NSAIDs, oral and topical analgesics, knee injections, knee braces, physical therapy, stretching, strengthening, and weight loss strategies, activity modification, ambulatory assistive devices. This patient and I went over a thorough discussion regarding weight and how that plays into surgical risk. This is a modifiable risk factor, we went over the danger curve for infection after surgery if a patient's body mass index is over 40, I went into the logistics of that, we also discussed significant strategies, dietitian, exercises that do not involve the lower extremity, offloading with nonweightbearing exercises. Once the body mass index is below 40, this can become a surgical solution if necessary and all other modalities have failed. A significant portion of discussion was centered around this need. The patient stated their understanding with this, and would like to proceed with nonsurgical management in the form of injection. HPI: This is a 61 y.o. male who complains of left knee pain. Onset was gradual.  The patient has had activity dependent pain for several years. The patient has tried activity modification, physical therapy exercises, injections have worked in the distant past.  The pain is in the medial knee, it is severe in intensity. The patient feels unstable with the knee, fears falling, and has significant limitation with activities of daily living, recreation, and walks with no device. Past Medical History:   Diagnosis Date    Hypertension        History reviewed. No pertinent surgical history.     Current Outpatient Medications on File Prior to Visit   Medication Sig Dispense Refill    naproxen sodium (ANAPROX) 550 mg tablet TAKE 1 TABLET EVERY 12 HOURS AS NEEDED FOR PAIN      atorvastatin (LIPITOR) 10 mg tablet Take 1 Tablet by mouth nightly. 90 Tablet 0    potassium chloride (KLOR-CON M10) 10 mEq tablet Take 1 Tablet by mouth daily. 90 Tablet 0    metoprolol succinate (TOPROL-XL) 100 mg tablet Take 1 Tablet by mouth daily. 90 Tablet 1    furosemide (LASIX) 20 mg tablet Take 1 Tablet by mouth daily. Additional fluid pill 90 Tablet 0    lisinopril-hydroCHLOROthiazide (PRINZIDE, ZESTORETIC) 20-25 mg per tablet Take 1 Tablet by mouth daily. 90 Tablet 0     No current facility-administered medications on file prior to visit. No Known Allergies    Family History   Family history unknown: Yes       Social History     Socioeconomic History    Marital status: SINGLE   Tobacco Use    Smoking status: Former    Smokeless tobacco: Never   Vaping Use    Vaping Use: Never used   Substance and Sexual Activity    Alcohol use: Yes     Alcohol/week: 15.0 standard drinks     Types: 10 Cans of beer, 5 Shots of liquor per week     Comment: pt states he is unable to give good estimate but does drink a lot     Drug use: Not Currently    Sexual activity: Not Currently     Partners: Female         Review of Systems:       General: Denies headache, lethargy, fever, weight loss  Ears/Nose/Throat: Denies ear discharge, drainage, nosebleeds, hoarse voice, dental problems  Cardiovascular: Denies chest pain, shortness of breath  Lungs: Denies chest pain, breathing problems, wheezing, pneumonia  Stomach: Denies stomach pain, heartburn, constipation, irritable bowel  Skin: Denies rash, sores, open wounds  Musculoskeletal: Admits to knee pain  Genitourinary: Denies dysuria, hematuria, polyuria  Gastrointestinal: Denies constipation, obstipation, diarrhea  Neurological: Denies changes in sight, smell, hearing, taste, seizures. Denies loss of consciousness.   Psychiatric: Denies depression, sleep pattern changes, anxiety, change in personality  Endocrine: Denies mood swings, heat or cold intolerance  Hematologic/Lymphatic: Denies anemia, purpura, petechia  Allergic/Immunologic: Denies swelling of throat, pain or swelling at lymph nodes      Physical Examination:    Visit Vitals  BP (!) 175/119 (BP 1 Location: Right upper arm, BP Patient Position: Sitting, BP Cuff Size: Large adult)   Pulse 68   Temp 97.9 °F (36.6 °C) (Temporal)   Ht 6' (1.829 m)   Wt 303 lb (137.4 kg)   SpO2 95%   BMI 41.09 kg/m²        General: AOX3, no apparent distress  Psychiatric: mood and affect appropriate  Lungs: breathing is symmetric and unlabored bilaterally  Heart: regular rate and rhythm  Abdomen: no guarding  Head: normocephalic, atraumatic  Skin: No significant abnormalities, good turgor  Sensation intact to light touch: L1-S1 dermatomes  Muscular exam: 5/5 strength in all major muscle groups unless noted in specialty exam.    Extremities:      Left upper extremity: Full active and passive range of motion without pain, deformity, no open wound, strength 5/5 in all major muscle groups. Right upper extremity: Full active and passive range of motion without pain, deformity, no open wound, strength 5/5 in all major muscle groups. Right lower extremity: Full active and passive range of motion without pain, deformity, no open wound, strength 5/5 in all major muscle groups. Left lower extremity:  No deformity is noted. Range of motion of the knee is 0-100. Ligamentous testing of the knee indicates stability of the the MCL, LCL, PCL, and ACL. Lachman's, anterior and posterior drawer tests are specifically negative. Medial joint line tenderness to palpation is noted. Popliteal area is unremarkable. 1+  for effusion. No patellar crepitus. Patella tracks centrally . Pivot shift is negative. Strength testing is indicative of 5/5 strength at hip flexion, extension, knee flexion and extension, tibialis anterior, EHL, and FHL. Sensation is intact to light touch in the L1-S1 dermatomes. Capillary refill is less than 2 seconds in the toes. Diagnostics:    Pertinent Diagnostics:  Xrays are available of the left knee, they indicate moderate osteoarthritis of the knee joint, no significant other findings, no other osseus abnormalities, fractures, or dislocations. Mr. Juan F Sanches has a reminder for a \"due or due soon\" health maintenance. I have asked that he contact his primary care provider for follow-up on this health maintenance. Date of Procedure: 1/18/2023  PROCEDURE NOTE: Left knee injection of Celestone    Consent was obtained from the patient. The correct site was identified after confirmation with the patient. Following identification and confirmation of the correct site with the patient, the superolateral left knee was prepped in the normal sterile fashion. A local anesthetic of 1% lidocaine without epinephrine was then administered to the local tissues. Following, an injection of a mixture of  6 mg Celestone and 1% lidocaine without epinephrine was administered to the left knee. The needle was then withdrawn and the injection site dressed with a sterile bandage at the conclusion. The procedure was well tolerated by the patient. No immediate adverse reactions were noted. Post injection instructions were given.

## 2023-01-26 ENCOUNTER — VIRTUAL VISIT (OUTPATIENT)
Dept: ORTHOPEDIC SURGERY | Age: 58
End: 2023-01-26
Payer: COMMERCIAL

## 2023-01-26 ENCOUNTER — TELEPHONE (OUTPATIENT)
Dept: ORTHOPEDIC SURGERY | Age: 58
End: 2023-01-26

## 2023-01-26 DIAGNOSIS — M17.12 UNILATERAL PRIMARY OSTEOARTHRITIS, LEFT KNEE: Primary | ICD-10-CM

## 2023-01-26 PROCEDURE — 99441 PR PHYS/QHP TELEPHONE EVALUATION 5-10 MIN: CPT | Performed by: ORTHOPAEDIC SURGERY

## 2023-01-26 RX ORDER — NAPROXEN 500 MG/1
500 TABLET ORAL 2 TIMES DAILY WITH MEALS
Qty: 90 TABLET | Refills: 2 | Status: SHIPPED | OUTPATIENT
Start: 2023-01-26

## 2023-01-26 NOTE — PROGRESS NOTES
1/26/2023      CC: left knee pain    HPI:      This is a 61y.o. year old male who presents for follow up of their left knee injection. The patient states that they have had moderate relief of their pain. The time since injection has been approximately a week. PMH:  Past Medical History:   Diagnosis Date    Hypertension        PSxHx:  History reviewed. No pertinent surgical history. Meds:    Current Outpatient Medications:     naproxen sodium (ANAPROX) 550 mg tablet, TAKE 1 TABLET EVERY 12 HOURS AS NEEDED FOR PAIN, Disp: , Rfl:     atorvastatin (LIPITOR) 10 mg tablet, Take 1 Tablet by mouth nightly., Disp: 90 Tablet, Rfl: 0    potassium chloride (KLOR-CON M10) 10 mEq tablet, Take 1 Tablet by mouth daily. , Disp: 90 Tablet, Rfl: 0    metoprolol succinate (TOPROL-XL) 100 mg tablet, Take 1 Tablet by mouth daily. , Disp: 90 Tablet, Rfl: 1    furosemide (LASIX) 20 mg tablet, Take 1 Tablet by mouth daily. Additional fluid pill, Disp: 90 Tablet, Rfl: 0    lisinopril-hydroCHLOROthiazide (PRINZIDE, ZESTORETIC) 20-25 mg per tablet, Take 1 Tablet by mouth daily. , Disp: 90 Tablet, Rfl: 0    All:  No Known Allergies    Social Hx:  Social History     Socioeconomic History    Marital status: SINGLE   Tobacco Use    Smoking status: Former    Smokeless tobacco: Never   Vaping Use    Vaping Use: Never used   Substance and Sexual Activity    Alcohol use:  Yes     Alcohol/week: 15.0 standard drinks     Types: 10 Cans of beer, 5 Shots of liquor per week     Comment: pt states he is unable to give good estimate but does drink a lot     Drug use: Not Currently    Sexual activity: Not Currently     Partners: Female       Family Hx:  Family History   Family history unknown: Yes         Review of Systems:       General: Denies headache, lethargy, fever, weight loss  Ears/Nose/Throat: Denies ear discharge, drainage, nosebleeds, hoarse voice, dental problems  Cardiovascular: Denies chest pain, shortness of breath  Lungs: Denies chest pain, breathing problems, wheezing, pneumonia  Stomach: Denies stomach pain, heartburn, constipation, irritable bowel  Skin: Denies rash, sores, open wounds  Musculoskeletal: left knee pain - moderate  Genitourinary: Denies dysuria, hematuria, polyuria  Gastrointestinal: Denies constipation, obstipation, diarrhea  Neurological: Denies changes in sight, smell, hearing, taste, seizures. Denies loss of consciousness. Psychiatric: Denies depression, sleep pattern changes, anxiety, change in personality  Endocrine: Denies mood swings, heat or cold intolerance  Hematologic/Lymphatic: Denies anemia, purpura, petechia  Allergic/Immunologic: Denies swelling of throat, pain or swelling at lymph nodes      Physical Examination:    There were no vitals taken for this visit. General: AOX3, no apparent distress  Psychiatric: mood and affect appropriate        Diagnostics:    Pertinent Diagnostics: none    Assessment: Status post left knee injection  Plan: This patient and I discussed the normal course for injections, we discussed that pain relief will likely be temporary to some degree, but I cannot predict the longevity of relief. We also discussed the limitations of injections, and that I cannot inject the same area any more often than every three months. We will proceed with viscosupplementation, naprosyn. Patient was in [place] at the time of consultation. I was at home while conducting this encounter. Consent:  He and/or his healthcare decision maker is aware that this patient-initiated Telehealth encounter is a billable service, with coverage as determined by his insurance carrier. He is aware that he may receive a bill and has provided verbal consent to proceed: Yes    This virtual visit was conducted telephone encounter only.  -  I affirm this is a Patient Initiated Episode with an Established Patient who has not had a related appointment within my department in the past 7 days or scheduled within the next 24 hours. Note: this encounter is not billable if this call serves to triage the patient into an appointment for the relevant concern. Total Time: minutes: 5-10 minutes. Mr. Jeramie Rodriguez has a reminder for a \"due or due soon\" health maintenance. I have asked that he contact his primary care provider for follow-up on this health maintenance.

## 2023-01-27 NOTE — TELEPHONE ENCOUNTER
Per benefits investigation, visco supplementation is not covered by this patient's medical or pharmacy benefits. He is eligible for the patient direct program which would cost the patient $425. If he is interested, I can enroll him and he will receive a call from [address] [place] [address] to collect payment and confirm shipping. Please contact the patient and advise on how he would like to proceed.

## 2023-01-30 NOTE — TELEPHONE ENCOUNTER
Patient called back and was provided with the information below about the patient direct program. He stated he would think about his options and call back to let us know.

## 2023-02-03 ENCOUNTER — VIRTUAL VISIT (OUTPATIENT)
Dept: INTERNAL MEDICINE CLINIC | Age: 58
End: 2023-02-03
Payer: COMMERCIAL

## 2023-02-03 DIAGNOSIS — I50.22 CHRONIC SYSTOLIC CONGESTIVE HEART FAILURE (HCC): ICD-10-CM

## 2023-02-03 DIAGNOSIS — I10 ESSENTIAL HYPERTENSION: Primary | ICD-10-CM

## 2023-02-03 RX ORDER — ATORVASTATIN CALCIUM 10 MG/1
10 TABLET, FILM COATED ORAL
Qty: 90 TABLET | Refills: 0 | Status: SHIPPED | OUTPATIENT
Start: 2023-02-03

## 2023-02-03 RX ORDER — POTASSIUM CHLORIDE 750 MG/1
10 TABLET, EXTENDED RELEASE ORAL DAILY
Qty: 90 TABLET | Refills: 1 | Status: SHIPPED | OUTPATIENT
Start: 2023-02-03

## 2023-02-03 RX ORDER — LISINOPRIL AND HYDROCHLOROTHIAZIDE 12.5; 2 MG/1; MG/1
2 TABLET ORAL DAILY
Qty: 180 TABLET | Refills: 0 | Status: SHIPPED | OUTPATIENT
Start: 2023-02-03

## 2023-02-03 RX ORDER — LISINOPRIL AND HYDROCHLOROTHIAZIDE 12.5; 2 MG/1; MG/1
2 TABLET ORAL DAILY
Qty: 90 TABLET | Refills: 0 | Status: SHIPPED | OUTPATIENT
Start: 2023-02-03 | End: 2023-02-03 | Stop reason: SDUPTHER

## 2023-02-03 RX ORDER — FUROSEMIDE 20 MG/1
20 TABLET ORAL DAILY
Qty: 90 TABLET | Refills: 0 | Status: SHIPPED | OUTPATIENT
Start: 2023-02-03

## 2023-02-03 RX ORDER — METOPROLOL SUCCINATE 100 MG/1
100 TABLET, EXTENDED RELEASE ORAL DAILY
Qty: 90 TABLET | Refills: 1 | Status: SHIPPED | OUTPATIENT
Start: 2023-02-03

## 2023-02-03 RX ORDER — LISINOPRIL AND HYDROCHLOROTHIAZIDE 20; 25 MG/1; MG/1
1 TABLET ORAL DAILY
Qty: 90 TABLET | Refills: 0 | Status: CANCELLED | OUTPATIENT
Start: 2023-02-03

## 2023-02-03 NOTE — LETTER
NOTIFICATION RETURN TO WORK / SCHOOL    2/3/2023 10:46 AM    Mr. Markus Clemons  [address] [id number]      To Whom It May Concern:    Markus Clemons is currently under the care of Marino Bae. He was unable to work due to high blood pressure, please excuse him from recently missed days (1/31/23 - 2/5/23)  His blood pressure is 150/119 today which is still high but improved, his medications were adjusted and he will have close follow up. If there are questions or concerns please have the patient contact our office. Sincerely,      Neel Henderson.  Vasu Rudolph NP

## 2023-02-03 NOTE — PROGRESS NOTES
Chief Complaint   Patient presents with    Hypertension     Pt states he has been taking meds but BP is still elevated and thinks meds need to be changed, BP this morning 150/119    Letter for School/Work     RTW d/t BP       1. \"Have you been to the ER, urgent care clinic since your last visit? Hospitalized since your last visit? \" No    2. \"Have you seen or consulted any other health care providers outside of the [address] [place] [address] since your last visit? \" No     3. For patients aged 39-70: Has the patient had a colonoscopy / FIT/ Cologuard? Yes - no Care Gap present      If the patient is female:    4. For patients aged 41-77: Has the patient had a mammogram within the past 2 years? NA - based on age or sex      11. For patients aged 21-65: Has the patient had a pap smear?  NA - based on age or sex

## 2023-02-03 NOTE — PROGRESS NOTES
Thomas Palmer is a 61 y.o. male who was seen by synchronous (real-time) audio-video technology on 2/3/2023 for Hypertension (Pt states he has been taking meds but BP is still elevated and thinks meds need to be changed, BP this morning 150/119) and Letter for School/Work (RTW d/t BP)        Assessment & Plan:   Diagnoses and all orders for this visit:    1. Essential hypertension  -     atorvastatin (LIPITOR) 10 mg tablet; Take 1 Tablet by mouth nightly. -     furosemide (LASIX) 20 mg tablet; Take 1 Tablet by mouth daily. Additional fluid pill  -     metoprolol succinate (TOPROL-XL) 100 mg tablet; Take 1 Tablet by mouth daily. -     potassium chloride (KLOR-CON M10) 10 mEq tablet; Take 1 Tablet by mouth daily. 2. Chronic systolic congestive heart failure (HCC)  -     atorvastatin (LIPITOR) 10 mg tablet; Take 1 Tablet by mouth nightly. -     furosemide (LASIX) 20 mg tablet; Take 1 Tablet by mouth daily. Additional fluid pill  -     metoprolol succinate (TOPROL-XL) 100 mg tablet; Take 1 Tablet by mouth daily. Other orders  -     lisinopril-hydroCHLOROthiazide (PRINZIDE, ZESTORETIC) 20-12.5 mg per tablet; Take 2 Tablets by mouth daily. The complexity of medical decision making for this visit is moderate     Increase prinzide  Home BP/HR checks    Subjective:       Prior to Admission medications    Medication Sig Start Date End Date Taking? Authorizing Provider   atorvastatin (LIPITOR) 10 mg tablet Take 1 Tablet by mouth nightly. 2/3/23  Yes Renata Rough., NP   furosemide (LASIX) 20 mg tablet Take 1 Tablet by mouth daily. Additional fluid pill 2/3/23  Yes Renata Rough., NP   metoprolol succinate (TOPROL-XL) 100 mg tablet Take 1 Tablet by mouth daily. 2/3/23  Yes Renata Rough., NP   potassium chloride (KLOR-CON M10) 10 mEq tablet Take 1 Tablet by mouth daily.  2/3/23  Yes Renata Rough., NP   lisinopril-hydroCHLOROthiazide (PRINZIDE, ZESTORETIC) 20-12.5 mg per tablet Take 2 Tablets by mouth daily. 2/3/23  Yes Duyen Ards., NP   naproxen (NAPROSYN) 500 mg tablet Take 1 Tablet by mouth two (2) times daily (with meals). 1/26/23   Sudhir Running, DO   atorvastatin (LIPITOR) 10 mg tablet Take 1 Tablet by mouth nightly. 11/7/22 2/3/23  [place] Ards., NP   potassium chloride (KLOR-CON M10) 10 mEq tablet Take 1 Tablet by mouth daily. 11/7/22 2/3/23  [place] Ards., NP   metoprolol succinate (TOPROL-XL) 100 mg tablet Take 1 Tablet by mouth daily. 11/7/22 2/3/23  Duyen Ards., NP   furosemide (LASIX) 20 mg tablet Take 1 Tablet by mouth daily. Additional fluid pill 10/17/22 2/3/23  Vena Mikaela Z., NP   lisinopril-hydroCHLOROthiazide (PRINZIDE, ZESTORETIC) 20-25 mg per tablet Take 1 Tablet by mouth daily. 10/17/22 2/3/23  [place] Ards., NP     Patient Active Problem List   Diagnosis Code    Obstructive sleep apnea syndrome G47.33       ROS    HTN: BP has been running very high 200s/100's  He was slightly symptomatic- hot/sweaty- sent to ER by his job  No med changes  He states he has been adherent w/ meds and diet has been pretty good- he has been losing weight  He is asymptomatic today  HR has been 70s  BP today 150/119    BP Readings from Last 3 Encounters:   01/18/23 (!) 175/119   11/07/22 (!) 162/97   08/29/22 (!) 146/86      Memory@[employer]        Objective:     Patient-Reported Vitals 2/3/2023   Patient-Reported Systolic  220   Patient-Reported Diastolic 228        [INSTRUCTIONS:  \"[x]\" Indicates a positive item  \"[]\" Indicates a negative item  -- DELETE ALL ITEMS NOT EXAMINED]    Constitutional: [x] Appears well-developed and well-nourished [x] No apparent distress      [] Abnormal -     Mental status: [x] Alert and awake  [x] Oriented to person/place/time [x] Able to follow commands    [] Abnormal -     Eyes:   EOM    [x]  Normal    [] Abnormal -   Sclera  [x]  Normal    [] Abnormal -          Discharge [x]  None visible   [] Abnormal -     HENT: [x] Normocephalic, atraumatic  [] Abnormal -   [x] Mouth/Throat: Mucous membranes are moist    External Ears [x] Normal  [] Abnormal -    Neck: [x] No visualized mass [] Abnormal -     Pulmonary/Chest: [x] Respiratory effort normal   [x] No visualized signs of difficulty breathing or respiratory distress        [] Abnormal -      Musculoskeletal:   [x] Normal gait with no signs of ataxia         [x] Normal range of motion of neck        [] Abnormal -     Neurological:        [x] No Facial Asymmetry (Cranial nerve 7 motor function) (limited exam due to video visit)          [x] No gaze palsy        [] Abnormal -          Skin:        [x] No significant exanthematous lesions or discoloration noted on facial skin         [] Abnormal -            Psychiatric:       [x] Normal Affect [] Abnormal -        [x] No Hallucinations    Other pertinent observable physical exam findings:-        We discussed the expected course, resolution and complications of the diagnosis(es) in detail. Medication risks, benefits, costs, interactions, and alternatives were discussed as indicated. I advised him to contact the office if his condition worsens, changes or fails to improve as anticipated. He expressed understanding with the diagnosis(es) and plan. Tatiana Rudolph, was evaluated through a synchronous (real-time) audio-video encounter. The patient (or guardian if applicable) is aware that this is a billable service, which includes applicable co-pays. This Virtual Visit was conducted with patient's (and/or legal guardian's) consent. The visit was conducted pursuant to the emergency declaration under the [address] [place] [address] and the [employer] and [employer] Act. Patient identification was verified, and a caregiver was present when appropriate.   The patient was located at: Home: [address] [phone]  The provider was located at: Home: [address] [place] [address].  Angel Sadler NP

## 2023-03-17 ENCOUNTER — OFFICE VISIT (OUTPATIENT)
Dept: CARDIOLOGY CLINIC | Age: 58
End: 2023-03-17
Payer: COMMERCIAL

## 2023-03-17 VITALS
WEIGHT: 285 LBS | DIASTOLIC BLOOD PRESSURE: 106 MMHG | SYSTOLIC BLOOD PRESSURE: 163 MMHG | BODY MASS INDEX: 38.6 KG/M2 | HEIGHT: 72 IN | RESPIRATION RATE: 15 BRPM | HEART RATE: 76 BPM | OXYGEN SATURATION: 94 %

## 2023-03-17 DIAGNOSIS — R73.03 PREDIABETES: ICD-10-CM

## 2023-03-17 DIAGNOSIS — I27.20 PULMONARY HYPERTENSION (HCC): ICD-10-CM

## 2023-03-17 DIAGNOSIS — I51.7 LVH (LEFT VENTRICULAR HYPERTROPHY): ICD-10-CM

## 2023-03-17 DIAGNOSIS — E66.01 CLASS 2 SEVERE OBESITY WITH SERIOUS COMORBIDITY AND BODY MASS INDEX (BMI) OF 38.0 TO 38.9 IN ADULT, UNSPECIFIED OBESITY TYPE (HCC): ICD-10-CM

## 2023-03-17 DIAGNOSIS — I10 PRIMARY HYPERTENSION: Primary | ICD-10-CM

## 2023-03-17 DIAGNOSIS — G47.33 OBSTRUCTIVE SLEEP APNEA: ICD-10-CM

## 2023-03-17 DIAGNOSIS — I50.810 RIGHT HEART FAILURE, NYHA CLASS 2 (HCC): ICD-10-CM

## 2023-03-17 PROCEDURE — 99205 OFFICE O/P NEW HI 60 MIN: CPT | Performed by: NURSE PRACTITIONER

## 2023-03-17 PROCEDURE — 93000 ELECTROCARDIOGRAM COMPLETE: CPT | Performed by: NURSE PRACTITIONER

## 2023-03-17 PROCEDURE — 3077F SYST BP >= 140 MM HG: CPT | Performed by: NURSE PRACTITIONER

## 2023-03-17 PROCEDURE — 3080F DIAST BP >= 90 MM HG: CPT | Performed by: NURSE PRACTITIONER

## 2023-03-17 RX ORDER — CARVEDILOL 12.5 MG/1
12.5 TABLET ORAL 2 TIMES DAILY WITH MEALS
Qty: 60 TABLET | Refills: 2 | Status: SHIPPED | OUTPATIENT
Start: 2023-03-17

## 2023-03-17 RX ORDER — SPIRONOLACTONE 25 MG/1
25 TABLET ORAL DAILY
Qty: 30 TABLET | Refills: 2 | Status: SHIPPED | OUTPATIENT
Start: 2023-03-17

## 2023-03-17 NOTE — PROGRESS NOTES
Subjective:   Yissel Stout is a 61 y.o.  male with a past medical history of obstructive sleep apnea, mild hypercholesterolemia, hypertension, and regular alcohol use who presents to our clinic today as a referral from primary care to help manage hypertension. In 2022 the patient started to experience significant fatigue, lower extremity swelling, and exertional dyspnea. While I do not have all of his records, he did have an echocardiogram in that year that revealed right-sided heart dysfunction (systolic dysfunction, dilated right atrium) with pulmonary hypertension. He also had an elevated proBNP (around 800). His blood pressure was very uncontrolled at that time, and he was also not on PAP for his longstanding obstructive sleep apnea. He has been compliant with all of his medications, but continues to have fairly elevated blood pressures. He states that he is on 20 mg of Lasix and this is helped the swelling in his lower extremities a lot. 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 About 3 years ago he believes he weighed around 200 pounds. His maximum weight was just above 300 pounds, and he has actually been able to lose about 20 pounds within the last 3 months. He states that he feels pretty well overall and that the BiPAP (which he just started a few months ago) has helped significantly with his quality of life. He denies any exertional chest pain (he is familiar with the term angina, and he said he is not having any symptoms like this), palpitations, near-syncope. He does have some occasional PND but he actually attributes that to waking up frightened from PTSD.   He does endorse early satiety, swelling of the lower extremities, and mild orthopnea, but overall these things have actually improved over the last 6 months or so.        Primary Care Physician: Brandie Amado., NP    Tobacco use: Former smoker, 1-1.5 PPD for 5-10 years. Now uses low dose nicotine pouches. Alcohol use: Currently drinks around 6 to 10 ounces of liquor daily. Has been doing this for a few years, before that had on and off later drinking. Illicit drug use: Former methamphetamine use, none now. Occupation: Works maintenance. Pertinent Family history: No history of premature CAD, sudden cardiac death, or known cardiomyopathies in immediate family. Physical activity: Does a fair amount of physical labor for his relatively new job, but no regular exercise outside of that. Diet: Recently has made some good dietary choices, including cutting back on saturated fats and salt. Cardiac risk factors: smoking/ tobacco exposure, dyslipidemia, obesity, male gender, hypertension, SIGIFREDO. Patient Active Problem List   Diagnosis Code    Obstructive sleep apnea syndrome G47.33     3/22 echo normal lvef, mod lvh, reduced rvef, ramu, mild mr and tr with pa pressure 58mm   Current Outpatient Medications   Medication Sig Dispense Refill    atorvastatin (LIPITOR) 10 mg tablet Take 1 Tablet by mouth nightly. 90 Tablet 0    furosemide (LASIX) 20 mg tablet Take 1 Tablet by mouth daily. Additional fluid pill 90 Tablet 0    metoprolol succinate (TOPROL-XL) 100 mg tablet Take 1 Tablet by mouth daily. 90 Tablet 1    potassium chloride (KLOR-CON M10) 10 mEq tablet Take 1 Tablet by mouth daily. 90 Tablet 1    lisinopril-hydroCHLOROthiazide (PRINZIDE, ZESTORETIC) 20-12.5 mg per tablet Take 2 Tablets by mouth daily. 180 Tablet 0    naproxen (NAPROSYN) 500 mg tablet Take 1 Tablet by mouth two (2) times daily (with meals). 90 Tablet 2     No Known Allergies  Past Medical History:   Diagnosis Date    Hypertension      Family History   Family history unknown: Yes     No past surgical history on file.   Social History     Tobacco Use    Smoking status: Former    Smokeless tobacco: Never   Substance Use Topics    Alcohol use: Yes     Alcohol/week: 15.0 standard drinks     Types: 10 Cans of beer, 5 Shots of liquor per week     Comment: pt states he is unable to give good estimate but does drink a lot         Lab Results   Component Value Date/Time    Cholesterol, total 161 02/15/2022 11:28 AM    HDL Cholesterol 60 02/15/2022 11:28 AM    LDL, calculated 82.4 02/15/2022 11:28 AM    VLDL, calculated 18.6 02/15/2022 11:28 AM    Triglyceride 93 02/15/2022 11:28 AM    CHOL/HDL Ratio 2.7 02/15/2022 11:28 AM        Lab Results   Component Value Date/Time    Hemoglobin A1c 6.3 (H) 08/18/2022 11:30 AM        Lab Results   Component Value Date/Time    Sodium 139 08/18/2022 11:30 AM    Potassium 4.0 08/18/2022 11:30 AM    Chloride 93 (L) 08/18/2022 11:30 AM    CO2 29 08/18/2022 11:30 AM    Anion gap 7 02/15/2022 11:28 AM    Glucose 125 (H) 08/18/2022 11:30 AM    BUN 26 (H) 08/18/2022 11:30 AM    Creatinine 1.21 08/18/2022 11:30 AM    BUN/Creatinine ratio 21 (H) 08/18/2022 11:30 AM    GFR est AA >60 02/15/2022 11:28 AM    GFR est non-AA >60 02/15/2022 11:28 AM    Calcium 8.9 08/18/2022 11:30 AM    Bilirubin, total 0.3 02/15/2022 11:28 AM    Alk. phosphatase 82 02/15/2022 11:28 AM    Protein, total 6.8 02/15/2022 11:28 AM    Albumin 3.4 (L) 02/15/2022 11:28 AM    Globulin 3.4 02/15/2022 11:28 AM    A-G Ratio 1.0 (L) 02/15/2022 11:28 AM    ALT (SGPT) 45 02/15/2022 11:28 AM    AST (SGOT) 22 02/15/2022 11:28 AM        Lab Results   Component Value Date/Time    WBC 7.6 02/15/2022 11:28 AM    HGB 14.2 02/15/2022 11:28 AM    HCT 44.8 02/15/2022 11:28 AM    PLATELET 315 45/92/0347 11:28 AM    MCV 97.2 02/15/2022 11:28 AM        There were no vitals taken for this visit. There is no height or weight on file to calculate BMI. Review of Systems   Constitutional:  Positive for weight loss. Negative for diaphoresis.    Respiratory:          Dyspnea on moderate exertion   Cardiovascular:  Positive for orthopnea, leg swelling and PND. Negative for chest pain and palpitations. Gastrointestinal:         Early satiety    Neurological:  Negative for dizziness. All other systems reviewed and are negative. Physical Exam  Vitals and nursing note reviewed. Exam conducted with a chaperone present. Constitutional:       General: He is not in acute distress. Appearance: He is obese. He is not ill-appearing. HENT:      Head: Normocephalic. Eyes:      Extraocular Movements:      Right eye: No nystagmus. Left eye: No nystagmus. Neck:      Vascular: No carotid bruit or JVD. Cardiovascular:      Rate and Rhythm: Normal rate and regular rhythm. Pulses: Normal pulses. Radial pulses are 2+ on the right side and 2+ on the left side. Heart sounds: Normal heart sounds. Comments: 1+ edema to both lower extremities. Skin feels diffusely warm and well-perfused. Pulmonary:      Effort: Pulmonary effort is normal. No tachypnea or respiratory distress. Breath sounds: Normal breath sounds and air entry. Chest:      Chest wall: No tenderness. Abdominal:      General: Abdomen is protuberant. Bowel sounds are normal. There is no distension. Palpations: There is no hepatomegaly. Tenderness: There is no abdominal tenderness. There is no guarding. Musculoskeletal:      Cervical back: Neck supple. No rigidity. Lymphadenopathy:      Cervical: No cervical adenopathy. Skin:     General: Skin is warm and dry. Coloration: Skin is not mottled. Neurological:      Mental Status: He is alert and oriented to person, place, and time. Mental status is at baseline. Motor: Motor function is intact. Coordination: Coordination is intact. Psychiatric:         Attention and Perception: Attention and perception normal.        EKG: Normal sinus rhythm with incomplete right bundle branch block. Normal axis. No ischemic abnormalities. Assessment/Plan:   1.  Primary hypertension  -Multifactorial, but likely worsened with underlying sleep apnea, alcohol use, nicotine use, and sedentary lifestyle. -BP around 160/100 today which seems relatively consistent with some of his pressure readings over the last year that I was able to review.  -Discontinue Toprol and start carvedilol 12.5 twice daily.  -Add spironolactone 25 mg daily  -Continue lisinopril HCTZ at current dose (40-25)  -Discontinue 10 mEq of potassium daily  -Repeat BMP in about 2 weeks. -Office visit in approximately 3 weeks for blood pressure recheck  -Counseled on increased physical activity, reduced alcohol intake (which we talked about extensively), and continued healthy eating behaviors. 2. Obstructive sleep apnea  -Markedly improved with using BiPAP as of the last few months.  -Sleeping about 8 hours a night and has much improved daytime energy levels    3. Pulmonary hypertension ([address]  -Likely secondary to SIGIFREDO and prior cigarette smoking.  -BiPAP will likely help tremendously. Patient is also stopped smoking cigarettes and does use low-dose nicotine pouches. 4. LVH (left ventricular hypertrophy)  -Moderate concentric hypertrophy.  -On beta-blocker and ACE-I    5. Right heart failure, NYHA class 2 (HCC)  -Seems to be doing much better from a symptom standpoint after starting Lasix and BiPAP  -Still has some symptoms with ordinary physical activity, but he actually believes that it is getting a little bit better/more tolerable.  -Last echocardiogram was 1 year ago. He is doing well from a symptom standpoint but I would likely be checking another echocardiogram within the next 6 months or so. 6. Prediabetes  -Hemoglobin A1c's have been around 6.3 and 6.4.  -Not on any medications.  -Has recently been cutting out sugary foods and high carbohydrate foods and has lost about 20 pounds over the last 3 months.  -Continue to monitor, but likely will improve with continued weight loss goals.     7. Class 3 severe obesity with serious comorbidity and body mass index (BMI) of 40.0 to 44.9 in adult, unspecified obesity type ([facility]  -Discussed above lifestyle modifications as opposed to medical interventions.  -I think now that the patient has better energy levels he will have more reserve to do more moderate physical activity without significant fatigue. Prior to this visit I have reviewed key aspects of the patient's medical record to optimize medical decision making. This includes, but is not limited to, prior office visits and emergency room encounters (if applicable). I have reviewed pertinent diagnostic test results (when available), vital signs, weights & and ambulatory blood pressure readings (when available), as well as personal and family medical history to develop an individualized care plan. I have spent time discussing both pharmacological and nonpharmacological treatment and preventative care measures for optimizing cardiovascular health and wellness. This includes, but is not limited to: obtaining regular physical activity as tolerated, achieving ideal weight and blood pressure, healthy eating habits, smoking cessation, limiting or eliminating alcohol intake, and avoidance of recreational drug use. I have emphasized the importance of adherence to the current medication regimen as well as routine follow up for preventative care measures.       Rhiannon Santiago NP  [facility] at [address], [address], [place], [address] [facility] [address]: [phone]  F: [phone]

## 2023-03-17 NOTE — PATIENT INSTRUCTIONS
You seem to be doing very well with BiPAP, and you are even starting to lose some weight, which is excellent. Try to cut back on your alcohol use. Instead of having 2 drinks a night try to just limit it to 1 and if you are not having cravings to even have that much, limited to about half of your standard drinking. Try to make these changes over a 1 to 2-week span to prevent any withdrawal symptoms. STOP taking metoprolol and potassium supplement  START taking carvedilol and spironolactone (for blood pressure and overall heart function). Go to the lab and have the blood test drawn between 3/31 and 4/4. These labs do not have to be done while fasting. Return to the clinic in approximately 3 to 4 weeks for blood pressure recheck. I doubt we will be at goal, but hopefully things will have gotten more controlled and we can make additional adjustments as needed.

## 2023-03-28 ENCOUNTER — PATIENT MESSAGE (OUTPATIENT)
Dept: INTERNAL MEDICINE CLINIC | Age: 58
End: 2023-03-28

## 2023-04-23 DIAGNOSIS — I10 PRIMARY HYPERTENSION: Primary | ICD-10-CM

## 2023-04-23 DIAGNOSIS — I27.20 PULMONARY HYPERTENSION (HCC): Primary | ICD-10-CM

## 2023-05-03 ENCOUNTER — TELEPHONE (OUTPATIENT)
Dept: CARDIOLOGY CLINIC | Age: 58
End: 2023-05-03

## 2023-05-05 DIAGNOSIS — I10 ESSENTIAL HYPERTENSION: ICD-10-CM

## 2023-05-05 DIAGNOSIS — I50.22 CHRONIC SYSTOLIC CONGESTIVE HEART FAILURE (HCC): ICD-10-CM

## 2023-05-05 RX ORDER — FUROSEMIDE 20 MG/1
20 TABLET ORAL DAILY
Qty: 90 TABLET | Refills: 0 | Status: SHIPPED | OUTPATIENT
Start: 2023-05-05

## 2023-05-05 RX ORDER — ATORVASTATIN CALCIUM 10 MG/1
10 TABLET, FILM COATED ORAL
Qty: 90 TABLET | Refills: 0 | Status: SHIPPED | OUTPATIENT
Start: 2023-05-05

## 2023-06-21 ENCOUNTER — HOSPITAL ENCOUNTER (OUTPATIENT)
Facility: HOSPITAL | Age: 58
Discharge: HOME | End: 2023-06-23
Payer: MEDICAID

## 2023-06-21 VITALS
WEIGHT: 295 LBS | SYSTOLIC BLOOD PRESSURE: 150 MMHG | HEIGHT: 72 IN | DIASTOLIC BLOOD PRESSURE: 100 MMHG | BODY MASS INDEX: 39.96 KG/M2

## 2023-06-21 DIAGNOSIS — R06.09 DYSPNEA ON EXERTION: ICD-10-CM

## 2023-06-21 DIAGNOSIS — I51.7 CARDIOMEGALY: ICD-10-CM

## 2023-06-21 DIAGNOSIS — I50.812 CHRONIC RIGHT-SIDED HEART FAILURE (HCC): ICD-10-CM

## 2023-06-21 DIAGNOSIS — R73.03 PREDIABETES: ICD-10-CM

## 2023-06-21 DIAGNOSIS — I10 ESSENTIAL (PRIMARY) HYPERTENSION: ICD-10-CM

## 2023-06-21 DIAGNOSIS — I27.20 PULMONARY HYPERTENSION, UNSPECIFIED (HCC): ICD-10-CM

## 2023-06-21 DIAGNOSIS — G47.33 OBSTRUCTIVE SLEEP APNEA (ADULT) (PEDIATRIC): ICD-10-CM

## 2023-06-21 DIAGNOSIS — Z78.9 ALCOHOL USE: ICD-10-CM

## 2023-06-21 LAB
ECHO AO ASC DIAM: 4.3 CM
ECHO AO ASCENDING AORTA INDEX: 1.71 CM/M2
ECHO AO ROOT DIAM: 4.3 CM
ECHO AO ROOT INDEX: 1.71 CM/M2
ECHO AV AREA PEAK VELOCITY: 3.1 CM2
ECHO AV AREA/BSA PEAK VELOCITY: 1.2 CM2/M2
ECHO AV PEAK GRADIENT: 4 MMHG
ECHO AV PEAK VELOCITY: 1 M/S
ECHO BSA: 2.61 M2
ECHO LA DIAMETER INDEX: 1.59 CM/M2
ECHO LA DIAMETER: 4 CM
ECHO LA TO AORTIC ROOT RATIO: 0.93
ECHO LA VOL 2C: 80 ML (ref 18–58)
ECHO LA VOL 4C: 64 ML (ref 18–58)
ECHO LA VOL BP: 77 ML (ref 18–58)
ECHO LA VOL/BSA BIPLANE: 31 ML/M2 (ref 16–34)
ECHO LA VOLUME AREA LENGTH: 83 ML
ECHO LA VOLUME INDEX A2C: 32 ML/M2 (ref 16–34)
ECHO LA VOLUME INDEX A4C: 25 ML/M2 (ref 16–34)
ECHO LA VOLUME INDEX AREA LENGTH: 33 ML/M2 (ref 16–34)
ECHO LV FRACTIONAL SHORTENING: 45 % (ref 28–44)
ECHO LV INTERNAL DIMENSION DIASTOLE INDEX: 2.11 CM/M2
ECHO LV INTERNAL DIMENSION DIASTOLIC: 5.3 CM (ref 4.2–5.9)
ECHO LV INTERNAL DIMENSION SYSTOLIC INDEX: 1.16 CM/M2
ECHO LV INTERNAL DIMENSION SYSTOLIC: 2.9 CM
ECHO LV IVSD: 1.4 CM (ref 0.6–1)
ECHO LV MASS 2D: 286.9 G (ref 88–224)
ECHO LV MASS INDEX 2D: 114.3 G/M2 (ref 49–115)
ECHO LV POSTERIOR WALL DIASTOLIC: 1.2 CM (ref 0.6–1)
ECHO LV RELATIVE WALL THICKNESS RATIO: 0.45
ECHO LVOT AREA: 3.8 CM2
ECHO LVOT DIAM: 2.2 CM
ECHO LVOT MEAN GRADIENT: 2 MMHG
ECHO LVOT PEAK GRADIENT: 3 MMHG
ECHO LVOT PEAK GRADIENT: 3 MMHG
ECHO LVOT PEAK VELOCITY: 0.8 M/S
ECHO LVOT PEAK VELOCITY: 0.9 M/S
ECHO LVOT STROKE VOLUME INDEX: 30.9 ML/M2
ECHO LVOT SV: 77.5 ML
ECHO LVOT VTI: 20.4 CM
ECHO MV A VELOCITY: 0.93 M/S
ECHO MV AREA PHT: 3.7 CM2
ECHO MV E DECELERATION TIME (DT): 203.4 MS
ECHO MV E VELOCITY: 0.9 M/S
ECHO MV E/A RATIO: 0.97
ECHO MV PRESSURE HALF TIME (PHT): 59 MS
ECHO PV MAX VELOCITY: 1.1 M/S
ECHO PV PEAK GRADIENT: 5 MMHG
ECHO RV TAPSE: 2.2 CM (ref 1.7–?)

## 2023-06-21 PROCEDURE — 93306 TTE W/DOPPLER COMPLETE: CPT

## 2023-06-22 LAB
ALBUMIN SERPL-MCNC: 3.7 G/DL (ref 3.5–5)
ALBUMIN/GLOB SERPL: 1.1 (ref 1.1–2.2)
ALP SERPL-CCNC: 80 U/L (ref 45–117)
ALT SERPL-CCNC: 32 U/L (ref 12–78)
ANION GAP SERPL CALC-SCNC: 9 MMOL/L (ref 5–15)
AST SERPL-CCNC: 22 U/L (ref 15–37)
BILIRUB SERPL-MCNC: 0.5 MG/DL (ref 0.2–1)
BUN SERPL-MCNC: 41 MG/DL (ref 6–20)
BUN/CREAT SERPL: 25 (ref 12–20)
CALCIUM SERPL-MCNC: 8.7 MG/DL (ref 8.5–10.1)
CHLORIDE SERPL-SCNC: 101 MMOL/L (ref 97–108)
CO2 SERPL-SCNC: 26 MMOL/L (ref 21–32)
CREAT SERPL-MCNC: 1.62 MG/DL (ref 0.7–1.3)
GLOBULIN SER CALC-MCNC: 3.5 G/DL (ref 2–4)
GLUCOSE SERPL-MCNC: 108 MG/DL (ref 65–100)
MAGNESIUM SERPL-MCNC: 2.2 MG/DL (ref 1.6–2.4)
NT PRO BNP: 154 PG/ML
POTASSIUM SERPL-SCNC: 4.3 MMOL/L (ref 3.5–5.1)
PROT SERPL-MCNC: 7.2 G/DL (ref 6.4–8.2)
SODIUM SERPL-SCNC: 136 MMOL/L (ref 136–145)

## 2023-06-26 ENCOUNTER — TELEPHONE (OUTPATIENT)
Age: 58
End: 2023-06-26

## 2023-06-27 ENCOUNTER — TELEPHONE (OUTPATIENT)
Age: 58
End: 2023-06-27

## 2023-06-27 DIAGNOSIS — R79.89 CREATININE ELEVATION: Primary | ICD-10-CM

## 2023-06-27 DIAGNOSIS — R73.03 PREDIABETES: ICD-10-CM

## 2023-08-02 DIAGNOSIS — I10 ESSENTIAL (PRIMARY) HYPERTENSION: ICD-10-CM

## 2023-08-02 DIAGNOSIS — I50.22 CHRONIC SYSTOLIC (CONGESTIVE) HEART FAILURE (HCC): ICD-10-CM

## 2023-08-03 RX ORDER — ATORVASTATIN CALCIUM 10 MG/1
TABLET, FILM COATED ORAL NIGHTLY
Qty: 90 TABLET | Refills: 0 | Status: SHIPPED | OUTPATIENT
Start: 2023-08-03

## 2023-08-21 ENCOUNTER — OFFICE VISIT (OUTPATIENT)
Age: 58
End: 2023-08-21

## 2023-08-21 VITALS
RESPIRATION RATE: 18 BRPM | BODY MASS INDEX: 40.58 KG/M2 | DIASTOLIC BLOOD PRESSURE: 79 MMHG | TEMPERATURE: 98.7 F | SYSTOLIC BLOOD PRESSURE: 127 MMHG | WEIGHT: 299.2 LBS | HEART RATE: 62 BPM | OXYGEN SATURATION: 93 %

## 2023-08-21 DIAGNOSIS — K58.0 IRRITABLE BOWEL SYNDROME WITH DIARRHEA: ICD-10-CM

## 2023-08-21 DIAGNOSIS — J20.9 BRONCHITIS WITH BRONCHOSPASM: Primary | ICD-10-CM

## 2023-08-21 PROBLEM — I10 ESSENTIAL HYPERTENSION: Status: ACTIVE | Noted: 2023-08-21

## 2023-08-21 PROBLEM — N52.2 DRUG-INDUCED ERECTILE DYSFUNCTION: Status: ACTIVE | Noted: 2023-08-21

## 2023-08-21 RX ORDER — BENZONATATE 200 MG/1
200 CAPSULE ORAL 3 TIMES DAILY PRN
Qty: 30 CAPSULE | Refills: 0 | Status: SHIPPED | OUTPATIENT
Start: 2023-08-21

## 2023-08-21 RX ORDER — METHYLPREDNISOLONE 4 MG/1
TABLET ORAL
Qty: 1 KIT | Refills: 0 | Status: SHIPPED | OUTPATIENT
Start: 2023-08-21

## 2023-08-21 RX ORDER — LISINOPRIL 40 MG/1
TABLET ORAL
COMMUNITY
Start: 2018-08-16

## 2023-08-21 RX ORDER — ALBUTEROL SULFATE 90 UG/1
2 AEROSOL, METERED RESPIRATORY (INHALATION) 4 TIMES DAILY PRN
Qty: 18 G | Refills: 0 | Status: SHIPPED | OUTPATIENT
Start: 2023-08-21

## 2023-08-21 RX ORDER — AMLODIPINE BESYLATE 10 MG/1
TABLET ORAL
COMMUNITY
Start: 2018-08-02

## 2023-08-21 RX ORDER — DEXTROMETHORPHAN HYDROBROMIDE AND PROMETHAZINE HYDROCHLORIDE 15; 6.25 MG/5ML; MG/5ML
5 SYRUP ORAL NIGHTLY PRN
Qty: 50 ML | Refills: 0 | Status: SHIPPED | OUTPATIENT
Start: 2023-08-21

## 2023-08-21 RX ORDER — CHOLESTYRAMINE 4 G/9G
1 POWDER, FOR SUSPENSION ORAL 2 TIMES DAILY
Qty: 30 PACKET | Refills: 0 | Status: SHIPPED | OUTPATIENT
Start: 2023-08-21

## 2023-08-21 RX ORDER — TADALAFIL 10 MG/1
TABLET ORAL
COMMUNITY
Start: 2018-08-17

## 2023-08-21 NOTE — PROGRESS NOTES
inhaler; Inhale 2 puffs into the lungs 4 times daily as needed for Wheezing, Disp-18 g, R-0Normal  2. Irritable bowel syndrome with diarrhea     Use Mucinex DM 1 tab twice a day     Use Prednisone if sxs not better in 4-5 days or wheezing worsens    No results found for any visits on 08/21/23. The patients condition was discussed with the patient and they understand. The patient is to follow up with primary care doctor. If signs and symptoms become worse the pt is to go to the ER. The patient is to take medications as prescribed.

## 2023-08-21 NOTE — PATIENT INSTRUCTIONS
Use Mucinex DM 1 tab twice a day     Use Prednisone if sxs not better in 4-5 days or wheezing worsens

## 2023-08-23 ASSESSMENT — ENCOUNTER SYMPTOMS
WHEEZING: 1
COUGH: 1
SHORTNESS OF BREATH: 1
FLATUS: 1
DIARRHEA: 1
HEMOPTYSIS: 0
BLOATING: 1
ABDOMINAL PAIN: 0
CHEST TIGHTNESS: 1
BLOOD IN STOOL: 0

## 2023-08-25 ENCOUNTER — TELEPHONE (OUTPATIENT)
Age: 58
End: 2023-08-25

## 2023-08-25 DIAGNOSIS — I10 ESSENTIAL (PRIMARY) HYPERTENSION: Primary | ICD-10-CM

## 2023-08-25 DIAGNOSIS — I50.812 CHRONIC RIGHT-SIDED HEART FAILURE (HCC): ICD-10-CM

## 2023-08-25 NOTE — TELEPHONE ENCOUNTER
Refill needed:      spironolactone (ALDACTONE) 25 MG tablet    lisinopril (PRINIVIL;ZESTRIL) 40 MG tablet        Thanks

## 2023-08-28 RX ORDER — SPIRONOLACTONE 25 MG/1
25 TABLET ORAL DAILY
Qty: 90 TABLET | Refills: 0 | Status: SHIPPED | OUTPATIENT
Start: 2023-08-28

## 2023-08-28 RX ORDER — LISINOPRIL AND HYDROCHLOROTHIAZIDE 20; 12.5 MG/1; MG/1
2 TABLET ORAL DAILY
Qty: 180 TABLET | Refills: 1 | Status: SHIPPED | OUTPATIENT
Start: 2023-08-28

## 2023-08-28 NOTE — PROGRESS NOTES
RX for spironolactone and Lisinopril HCTZ refilled for patient. (See med list)  Per Hilda Anand NP verbal order   Future Appointments   Date Time Provider [address] [place] [address]   12/18/2023  9:20 AM NYASIA Roe - NP [facility] - [facility] AMB

## 2023-09-05 DIAGNOSIS — I10 ESSENTIAL (PRIMARY) HYPERTENSION: ICD-10-CM

## 2023-09-05 DIAGNOSIS — I50.22 CHRONIC SYSTOLIC (CONGESTIVE) HEART FAILURE (HCC): ICD-10-CM

## 2023-09-07 DIAGNOSIS — Z12.11 COLON CANCER SCREENING: Primary | ICD-10-CM

## 2023-09-07 RX ORDER — ATORVASTATIN CALCIUM 10 MG/1
10 TABLET, FILM COATED ORAL NIGHTLY
Qty: 90 TABLET | Refills: 0 | Status: SHIPPED | OUTPATIENT
Start: 2023-09-07

## 2024-01-07 ASSESSMENT — VISUAL ACUITY: OU: 1

## 2024-01-08 ENCOUNTER — OFFICE VISIT (OUTPATIENT)
Age: 59
End: 2024-01-08
Payer: MEDICAID

## 2024-01-08 VITALS
DIASTOLIC BLOOD PRESSURE: 88 MMHG | WEIGHT: 315 LBS | BODY MASS INDEX: 42.66 KG/M2 | SYSTOLIC BLOOD PRESSURE: 148 MMHG | OXYGEN SATURATION: 96 % | HEART RATE: 100 BPM | HEIGHT: 72 IN

## 2024-01-08 DIAGNOSIS — I10 ESSENTIAL (PRIMARY) HYPERTENSION: Primary | ICD-10-CM

## 2024-01-08 DIAGNOSIS — I50.812 CHRONIC RIGHT-SIDED HEART FAILURE (HCC): ICD-10-CM

## 2024-01-08 DIAGNOSIS — G47.33 OSA (OBSTRUCTIVE SLEEP APNEA): ICD-10-CM

## 2024-01-08 DIAGNOSIS — I50.22 CHRONIC SYSTOLIC (CONGESTIVE) HEART FAILURE (HCC): ICD-10-CM

## 2024-01-08 DIAGNOSIS — I51.7 ENLARGED RV (RIGHT VENTRICLE): ICD-10-CM

## 2024-01-08 DIAGNOSIS — R53.83 OTHER FATIGUE: ICD-10-CM

## 2024-01-08 DIAGNOSIS — R79.89 ELEVATED SERUM CREATININE: ICD-10-CM

## 2024-01-08 PROCEDURE — 3079F DIAST BP 80-89 MM HG: CPT | Performed by: NURSE PRACTITIONER

## 2024-01-08 PROCEDURE — 99214 OFFICE O/P EST MOD 30 MIN: CPT | Performed by: NURSE PRACTITIONER

## 2024-01-08 PROCEDURE — 3077F SYST BP >= 140 MM HG: CPT | Performed by: NURSE PRACTITIONER

## 2024-01-08 RX ORDER — LISINOPRIL AND HYDROCHLOROTHIAZIDE 20; 12.5 MG/1; MG/1
2 TABLET ORAL DAILY
Qty: 180 TABLET | Refills: 1 | Status: SHIPPED | OUTPATIENT
Start: 2024-01-08

## 2024-01-08 RX ORDER — AMLODIPINE BESYLATE 5 MG/1
5 TABLET ORAL DAILY
Qty: 30 TABLET | Refills: 3 | Status: SHIPPED | OUTPATIENT
Start: 2024-01-08

## 2024-01-08 RX ORDER — ATORVASTATIN CALCIUM 10 MG/1
10 TABLET, FILM COATED ORAL NIGHTLY
Qty: 90 TABLET | Refills: 0 | Status: SHIPPED | OUTPATIENT
Start: 2024-01-08

## 2024-01-08 RX ORDER — TRIAMCINOLONE ACETONIDE 1 MG/G
CREAM TOPICAL
COMMUNITY
Start: 2023-12-24

## 2024-01-08 RX ORDER — FUROSEMIDE 20 MG/1
20 TABLET ORAL DAILY PRN
Qty: 30 TABLET | Refills: 1 | Status: SHIPPED | OUTPATIENT
Start: 2024-01-08

## 2024-01-08 RX ORDER — SPIRONOLACTONE 25 MG/1
25 TABLET ORAL DAILY
Qty: 90 TABLET | Refills: 0 | Status: SHIPPED | OUTPATIENT
Start: 2024-01-08

## 2024-01-08 NOTE — PATIENT INSTRUCTIONS
Continue to take these medications specifically for blood pressure:  Spironolactone 25 mg daily  Lisinopril-HCTZ 40-25 mg daily (still have to take 2 tablets because it would not let me order the higher strength and 1 tablet).  Start 5 mg of amlodipine daily.    I am going to check labs on you including a testosterone level.  This level needs to be checked between 8 AM and 10 AM to be most accurate.    I will give you a call within a few weeks with the results.  For now, I would recommend going to [facility] if they are covered by insurance to see if they have any treatment options that might help with some of your fatigue.   phone call from pt's mother

mother made aware of pt's status. stated, "i'm on my way" to hospital.

## 2024-01-08 NOTE — PROGRESS NOTES
Subjective:   Francis Sierra is a 57 y.o.  male with a past medical history of obstructive sleep apnea, pulmonary hypertension, reduced RV function, hypercholesterolemia, hypertension, and regular alcohol use who presents today for routine follow up (6 month).    Updates since last visit:  -No ED visits / hospitalizations.  -Last OP visit (August 2023) BP was 127/79.  -Never got repeat labs to re-eval up trending creatinine.   Lab Results   Component Value Date    CREATININE 1.62 (H) 06/21/2023    CREATININE 1.21 08/18/2022    CREATININE 1.09 02/15/2022     Mr. Sierra presents to clinic today with an above goal blood pressure of approximately 150/90.  It seems that he is intolerant to carvedilol due to GI side effects.  He states that he was going to the bathroom very often since he started this medication and also had pretty significant abdominal cramping, and when he stopped taking it all of his symptoms subsided.  He then even tried taking it again and his GI symptoms returned.  He has not been taking this medication for several weeks due to the side effects.    It sounds like he has done an excellent job with reducing his alcohol intake to the point where he only has a few drinks a day.  He is still compliant with his BiPAP.  He recently started new job that he seems to really like.  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  He climbs several flights of stairs daily, shovels, and is on his feet most of the day doing manual labor.  He seems to really enjoy this work and he said that he can feel his energy levels and exercise tolerance improving the more he does it.    Unfortunately, it looks like he has continued to gain weight despite making some very healthy dietary changes such as having salads and lean meats regularly.    He is asking about low testosterone because he feels like he could be suffering from this issue.  It does not sound like he has ever had his

## 2024-01-12 ENCOUNTER — TELEPHONE (OUTPATIENT)
Age: 59
End: 2024-01-12

## 2024-01-12 NOTE — TELEPHONE ENCOUNTER
Patient called requesting medication refill for Spironolactone 25mg send to [facility]#[phone]    Patient# [phone]

## 2024-01-12 NOTE — TELEPHONE ENCOUNTER
Left secure message for patient to call [facility] regarding his Spironolactone which he requested a refill.  Per notes,Chris Gama NP sent this RX to [facility] [address] on 01/08/2024

## 2024-01-31 ENCOUNTER — TELEPHONE (OUTPATIENT)
Age: 59
End: 2024-01-31

## 2024-01-31 DIAGNOSIS — I50.22 CHRONIC SYSTOLIC (CONGESTIVE) HEART FAILURE (HCC): Primary | ICD-10-CM

## 2024-01-31 RX ORDER — FUROSEMIDE 20 MG/1
20 TABLET ORAL DAILY PRN
Qty: 30 TABLET | Refills: 0 | Status: SHIPPED | OUTPATIENT
Start: 2024-01-31

## 2024-01-31 RX ORDER — FUROSEMIDE 20 MG/1
20 TABLET ORAL DAILY PRN
Qty: 30 TABLET | Refills: 1 | OUTPATIENT
Start: 2024-01-31

## 2024-01-31 NOTE — TELEPHONE ENCOUNTER
Returned patient's call and spoke with Francis Sierra after 2 identifiers.  Relayed message to him from Chris Gama NP that he had lab work ordered several weeks ago and that it is important for him to have them done ASAP due to kidney numbers being elevated.  Patient states he plans to hopefully get them done tomorrow morning as it has been hard for him to get in to have labs done early morning fasting due to work.  Explained one refill was sent in for Lasix and Chris needs to see lab results before we can send any future refills.    Pt verbalized understanding with no further questions.

## 2024-02-01 DIAGNOSIS — R79.89 ELEVATED SERUM CREATININE: ICD-10-CM

## 2024-02-01 DIAGNOSIS — R53.83 OTHER FATIGUE: ICD-10-CM

## 2024-02-01 DIAGNOSIS — G47.33 OSA (OBSTRUCTIVE SLEEP APNEA): ICD-10-CM

## 2024-02-02 LAB
ANION GAP SERPL CALC-SCNC: 6 MMOL/L (ref 5–15)
BUN SERPL-MCNC: 22 MG/DL (ref 6–20)
BUN/CREAT SERPL: 17 (ref 12–20)
CALCIUM SERPL-MCNC: 9.9 MG/DL (ref 8.5–10.1)
CHLORIDE SERPL-SCNC: 103 MMOL/L (ref 97–108)
CO2 SERPL-SCNC: 29 MMOL/L (ref 21–32)
CREAT SERPL-MCNC: 1.32 MG/DL (ref 0.7–1.3)
GLUCOSE SERPL-MCNC: 140 MG/DL (ref 65–100)
POTASSIUM SERPL-SCNC: 4.7 MMOL/L (ref 3.5–5.1)
SODIUM SERPL-SCNC: 138 MMOL/L (ref 136–145)
T4 FREE SERPL-MCNC: 1 NG/DL (ref 0.8–1.5)
TSH SERPL DL<=0.05 MIU/L-ACNC: 0.84 UIU/ML (ref 0.36–3.74)

## 2024-02-03 LAB
EST. AVERAGE GLUCOSE BLD GHB EST-MCNC: 123 MG/DL
HBA1C MFR BLD: 5.9 % (ref 4–5.6)

## 2024-02-05 ENCOUNTER — TELEPHONE (OUTPATIENT)
Age: 59
End: 2024-02-05

## 2024-02-11 LAB
TESTOST FREE SERPL-MCNC: 15.2 PG/ML (ref 7.2–24)
TESTOST SERPL-MCNC: 346 NG/DL (ref 264–916)

## 2024-04-04 ENCOUNTER — TELEPHONE (OUTPATIENT)
Age: 59
End: 2024-04-04

## 2024-04-04 DIAGNOSIS — I10 ESSENTIAL (PRIMARY) HYPERTENSION: Primary | ICD-10-CM

## 2024-04-04 RX ORDER — AMLODIPINE BESYLATE 5 MG/1
5 TABLET ORAL DAILY
Qty: 30 TABLET | Refills: 3 | Status: SHIPPED | OUTPATIENT
Start: 2024-04-04

## 2024-05-29 ENCOUNTER — TELEPHONE (OUTPATIENT)
Age: 59
End: 2024-05-29

## 2024-05-29 DIAGNOSIS — I50.812 CHRONIC RIGHT-SIDED HEART FAILURE (HCC): ICD-10-CM

## 2024-05-29 RX ORDER — SPIRONOLACTONE 25 MG/1
25 TABLET ORAL DAILY
Qty: 90 TABLET | Refills: 1 | Status: SHIPPED | OUTPATIENT
Start: 2024-05-29

## 2024-05-29 NOTE — TELEPHONE ENCOUNTER
Requested Prescriptions     Signed Prescriptions Disp Refills    spironolactone (ALDACTONE) 25 MG tablet 90 tablet 1     Sig: Take 1 tablet by mouth daily     Authorizing Provider: CHRIS GAMA     Ordering User: BRAD KRUEGER      Future Appointments   Date Time Provider Department Center   7/8/2024  3:00 PM [facility]      Per Verbal Order by MD/NP

## 2024-08-01 ENCOUNTER — TELEPHONE (OUTPATIENT)
Age: 59
End: 2024-08-01

## 2024-08-01 DIAGNOSIS — I10 ESSENTIAL (PRIMARY) HYPERTENSION: ICD-10-CM

## 2024-08-01 RX ORDER — LISINOPRIL AND HYDROCHLOROTHIAZIDE 12.5; 2 MG/1; MG/1
2 TABLET ORAL DAILY
Qty: 60 TABLET | Refills: 1 | Status: SHIPPED | OUTPATIENT
Start: 2024-08-01 | End: 2024-09-05 | Stop reason: SDUPTHER

## 2024-08-01 NOTE — TELEPHONE ENCOUNTER
Requested Prescriptions     Signed Prescriptions Disp Refills    lisinopril-hydroCHLOROthiazide (PRINZIDE;ZESTORETIC) 20-12.5 MG per tablet 60 tablet 1     Sig: Take 2 tablets by mouth daily     Authorizing Provider: DELMY JACQUES     Ordering User: BRAD KRUEGER      No future appointments.   Per Verbal Order by MD/NP

## 2024-08-02 ENCOUNTER — TELEPHONE (OUTPATIENT)
Age: 59
End: 2024-08-02

## 2024-08-02 DIAGNOSIS — I10 ESSENTIAL (PRIMARY) HYPERTENSION: ICD-10-CM

## 2024-08-02 RX ORDER — AMLODIPINE BESYLATE 5 MG/1
5 TABLET ORAL DAILY
Qty: 30 TABLET | Refills: 1 | Status: SHIPPED | OUTPATIENT
Start: 2024-08-02

## 2024-08-02 NOTE — TELEPHONE ENCOUNTER
Requested Prescriptions     Signed Prescriptions Disp Refills    amLODIPine (NORVASC) 5 MG tablet 30 tablet 1     Sig: Take 1 tablet by mouth daily     Authorizing Provider: DELMY JACQUES     Ordering User: BRAD KRUEGER      No future appointments.   Per Verbal Order by MD/NP     Message was left 8/1/2024 for pt to call here to reschedule.

## 2024-08-08 DIAGNOSIS — I10 ESSENTIAL (PRIMARY) HYPERTENSION: ICD-10-CM

## 2024-08-08 DIAGNOSIS — I50.22 CHRONIC SYSTOLIC (CONGESTIVE) HEART FAILURE (HCC): ICD-10-CM

## 2024-08-08 RX ORDER — ATORVASTATIN CALCIUM 10 MG/1
10 TABLET, FILM COATED ORAL NIGHTLY
Qty: 90 TABLET | Refills: 0 | OUTPATIENT
Start: 2024-08-08

## 2024-09-04 ENCOUNTER — TELEPHONE (OUTPATIENT)
Age: 59
End: 2024-09-04

## 2024-09-04 DIAGNOSIS — I10 ESSENTIAL (PRIMARY) HYPERTENSION: ICD-10-CM

## 2024-09-04 NOTE — TELEPHONE ENCOUNTER
Refill request:      amLODIPine (NORVASC) 5 MG tablet    lisinopril-hydroCHLOROthiazide (PRINZIDE;ZESTORETIC) 20-12.5 MG per      Missed last 2 appts-  29%  no show      Thanks

## 2024-09-05 RX ORDER — LISINOPRIL AND HYDROCHLOROTHIAZIDE 12.5; 2 MG/1; MG/1
2 TABLET ORAL DAILY
Qty: 180 TABLET | Refills: 0 | Status: SHIPPED | OUTPATIENT
Start: 2024-09-05

## 2024-09-05 RX ORDER — AMLODIPINE BESYLATE 5 MG/1
5 TABLET ORAL DAILY
Qty: 90 TABLET | Refills: 0 | Status: SHIPPED | OUTPATIENT
Start: 2024-09-05 | End: 2024-09-30

## 2024-09-05 NOTE — TELEPHONE ENCOUNTER
Per Chris Gama NP    We can refill but must advised that he needs to be seen within the next 3 months or so.    Chris Xie     Message sent to Carissa Davis to see if patient will make appt.    Requested Prescriptions     Signed Prescriptions Disp Refills    amLODIPine (NORVASC) 5 MG tablet 90 tablet 0     Sig: Take 1 tablet by mouth daily     Authorizing Provider: CHRIS GAMA     Ordering User: BRAD KRUEGER    lisinopril-hydroCHLOROthiazide (PRINZIDE;ZESTORETIC) 20-12.5 MG per tablet 180 tablet 0     Sig: Take 2 tablets by mouth daily     Authorizing Provider: CHRIS GAMA     Ordering User: BRAD KRUEGER      No future appointments.   Per Verbal Order by MD/NP

## 2024-09-06 DIAGNOSIS — I50.22 CHRONIC SYSTOLIC (CONGESTIVE) HEART FAILURE (HCC): ICD-10-CM

## 2024-09-06 DIAGNOSIS — I10 ESSENTIAL (PRIMARY) HYPERTENSION: ICD-10-CM

## 2024-09-09 RX ORDER — ATORVASTATIN CALCIUM 10 MG/1
10 TABLET, FILM COATED ORAL NIGHTLY
Qty: 90 TABLET | Refills: 0 | OUTPATIENT
Start: 2024-09-09

## 2024-09-19 NOTE — PROGRESS NOTES
[unfilled]  Lisa Amy is a 61 y.o. male  Chief Complaint   Patient presents with    Knee Pain     Lt Knee     Visit Vitals  BP (!) 175/119 (BP 1 Location: Right upper arm, BP Patient Position: Sitting, BP Cuff Size: Large adult)   Pulse 68   Temp 97.9 °F (36.6 °C) (Temporal)   Ht 6' (1.829 m)   Wt 303 lb (137.4 kg)   SpO2 95%   BMI 41.09 kg/m²     1. Have you been to the ER, urgent care clinic since your last visit? Hospitalized since your last visit? No    2. Have you seen or consulted any other health care providers outside of the [address] [place] [address] since your last visit? Include any pap smears or colon screening.  Yes When: Jan 5, 2023 Where: [facility] done

## 2024-09-24 ENCOUNTER — TELEPHONE (OUTPATIENT)
Age: 59
End: 2024-09-24

## 2024-09-24 DIAGNOSIS — I10 ESSENTIAL (PRIMARY) HYPERTENSION: ICD-10-CM

## 2024-09-24 DIAGNOSIS — I50.22 CHRONIC SYSTOLIC (CONGESTIVE) HEART FAILURE (HCC): ICD-10-CM

## 2024-09-24 DIAGNOSIS — I50.812 CHRONIC RIGHT-SIDED HEART FAILURE (HCC): ICD-10-CM

## 2024-09-24 RX ORDER — ATORVASTATIN CALCIUM 10 MG/1
10 TABLET, FILM COATED ORAL NIGHTLY
Qty: 90 TABLET | Refills: 0 | Status: SHIPPED | OUTPATIENT
Start: 2024-09-24

## 2024-09-24 RX ORDER — SPIRONOLACTONE 25 MG/1
25 TABLET ORAL DAILY
Qty: 90 TABLET | Refills: 1 | Status: SHIPPED | OUTPATIENT
Start: 2024-09-24

## 2024-09-30 ENCOUNTER — OFFICE VISIT (OUTPATIENT)
Age: 59
End: 2024-09-30
Payer: COMMERCIAL

## 2024-09-30 VITALS
SYSTOLIC BLOOD PRESSURE: 160 MMHG | HEIGHT: 72 IN | HEART RATE: 64 BPM | WEIGHT: 312 LBS | DIASTOLIC BLOOD PRESSURE: 108 MMHG | BODY MASS INDEX: 42.26 KG/M2 | OXYGEN SATURATION: 95 %

## 2024-09-30 DIAGNOSIS — I50.812 CHRONIC RIGHT-SIDED HEART FAILURE (HCC): Primary | ICD-10-CM

## 2024-09-30 DIAGNOSIS — G47.33 OSA (OBSTRUCTIVE SLEEP APNEA): ICD-10-CM

## 2024-09-30 DIAGNOSIS — I10 PRIMARY HYPERTENSION: ICD-10-CM

## 2024-09-30 PROCEDURE — 99214 OFFICE O/P EST MOD 30 MIN: CPT | Performed by: NURSE PRACTITIONER

## 2024-09-30 PROCEDURE — 3080F DIAST BP >= 90 MM HG: CPT | Performed by: NURSE PRACTITIONER

## 2024-09-30 PROCEDURE — 3077F SYST BP >= 140 MM HG: CPT | Performed by: NURSE PRACTITIONER

## 2024-09-30 RX ORDER — AMLODIPINE BESYLATE 10 MG/1
10 TABLET ORAL DAILY
Qty: 90 TABLET | Refills: 1 | Status: SHIPPED | OUTPATIENT
Start: 2024-09-30

## 2024-09-30 RX ORDER — SPIRONOLACTONE 25 MG/1
25 TABLET ORAL DAILY
Qty: 90 TABLET | Refills: 1 | Status: SHIPPED | OUTPATIENT
Start: 2024-09-30

## 2024-09-30 ASSESSMENT — PATIENT HEALTH QUESTIONNAIRE - PHQ9
SUM OF ALL RESPONSES TO PHQ QUESTIONS 1-9: 0
SUM OF ALL RESPONSES TO PHQ QUESTIONS 1-9: 0
SUM OF ALL RESPONSES TO PHQ9 QUESTIONS 1 & 2: 0
SUM OF ALL RESPONSES TO PHQ QUESTIONS 1-9: 0
SUM OF ALL RESPONSES TO PHQ QUESTIONS 1-9: 0
1. LITTLE INTEREST OR PLEASURE IN DOING THINGS: NOT AT ALL
2. FEELING DOWN, DEPRESSED OR HOPELESS: NOT AT ALL

## 2024-09-30 ASSESSMENT — ENCOUNTER SYMPTOMS: SHORTNESS OF BREATH: 0

## 2024-09-30 NOTE — PROGRESS NOTES
to monitor on serial echoes.    9. Fatigue  -Serum testosterone level within normal limits. Normal TSH and free T4.   -Suspect alcohol use is contributing to daytime fatigue.        Prior to this visit I have reviewed key aspects of the patient's medical record to optimize medical decision making. This includes, but is not limited to, prior office visits and emergency room encounters (if applicable).   I have reviewed pertinent diagnostic test results (when available), vital signs, weights & and ambulatory blood pressure readings (when available), as well as personal and family medical history to develop an individualized care plan.    I have spent time discussing both pharmacological and nonpharmacological treatment and preventative care measures for optimizing cardiovascular health and wellness. This includes, but is not limited to: obtaining regular physical activity as tolerated, achieving ideal weight and blood pressure, healthy eating habits, smoking cessation, limiting or eliminating alcohol intake, and avoidance of recreational drug use.    I have emphasized the importance of adherence to the current medication regimen as well as routine follow up for preventative care measures.      Chris Gama NP  [facility] at [facility]  [address] , [address], [facility], [address]  P: [phone]  F: [phone]

## 2024-09-30 NOTE — PATIENT INSTRUCTIONS
Please increase your amlodipine to 10 mg once daily (new prescription has been sent in your pharmacy) and restart 25 mg of spironolactone once daily.    I would like you to keep a log of your blood pressures starting within a few weeks and please send us a [employer] message with at least 5 of your readings by mid to late October.    Do your absolute best to reduce your intake of beer, wine, and vodka.  This will likely improve your daytime energy levels, GI symptoms, and even help your blood pressure.

## 2024-10-25 ENCOUNTER — TELEPHONE (OUTPATIENT)
Age: 59
End: 2024-10-25

## 2024-10-25 NOTE — TELEPHONE ENCOUNTER
----- Message from NYASIA Tierney NP sent at 10/25/2024 10:43 AM EDT -----  Regarding: BP  Marleen Gonzales,    Can you call Mr. Sierra and request that he send us some of his home BP readings or verbalize how they have looked over the phone?  If they are consistently above 140/90 it would be best to start 200 mg of labetalol twice daily and he can notify us if he has any issues with this.    Thanks,    Chris

## 2024-11-20 ENCOUNTER — TELEPHONE (OUTPATIENT)
Age: 59
End: 2024-11-20

## 2024-11-20 NOTE — TELEPHONE ENCOUNTER
Attempted to reach patient by telephone.  Left a secure message to  have him call us  to let us know his recent BP readings and to please call this office for further instructions.

## 2024-12-02 ENCOUNTER — TELEPHONE (OUTPATIENT)
Age: 59
End: 2024-12-02

## 2024-12-02 DIAGNOSIS — I10 ESSENTIAL (PRIMARY) HYPERTENSION: ICD-10-CM

## 2024-12-02 RX ORDER — LISINOPRIL AND HYDROCHLOROTHIAZIDE 12.5; 2 MG/1; MG/1
2 TABLET ORAL DAILY
Qty: 180 TABLET | Refills: 1 | Status: SHIPPED | OUTPATIENT
Start: 2024-12-02

## 2024-12-02 NOTE — TELEPHONE ENCOUNTER
Requested Prescriptions     Signed Prescriptions Disp Refills    lisinopril-hydroCHLOROthiazide (PRINZIDE;ZESTORETIC) 20-12.5 MG per tablet 180 tablet 1     Sig: Take 2 tablets by mouth daily     Authorizing Provider: CHRIS GAMA     Ordering User: BRAD KRUEGER      Future Appointments   Date Time Provider Department Center   3/3/2025  3:00 PM Chris Gama, NYASIA - NP [facility] AMB      Per Verbal Order by MD/NP

## 2025-01-11 ENCOUNTER — HOSPITAL ENCOUNTER (INPATIENT)
Facility: HOSPITAL | Age: 60
LOS: 3 days | Discharge: HOME | DRG: 382 | End: 2025-01-14
Attending: STUDENT IN AN ORGANIZED HEALTH CARE EDUCATION/TRAINING PROGRAM | Admitting: INTERNAL MEDICINE
Payer: COMMERCIAL

## 2025-01-11 ENCOUNTER — APPOINTMENT (OUTPATIENT)
Facility: HOSPITAL | Age: 60
DRG: 382 | End: 2025-01-11
Payer: COMMERCIAL

## 2025-01-11 DIAGNOSIS — C79.51 METASTATIC CANCER TO BONE (HCC): ICD-10-CM

## 2025-01-11 DIAGNOSIS — J96.00 ACUTE RESPIRATORY FAILURE, UNSPECIFIED WHETHER WITH HYPOXIA OR HYPERCAPNIA: Primary | ICD-10-CM

## 2025-01-11 DIAGNOSIS — J81.0 ACUTE PULMONARY EDEMA: ICD-10-CM

## 2025-01-11 DIAGNOSIS — R93.89 ABNORMAL CT OF THE CHEST: ICD-10-CM

## 2025-01-11 DIAGNOSIS — J44.1 CHRONIC OBSTRUCTIVE PULMONARY DISEASE WITH ACUTE EXACERBATION (HCC): ICD-10-CM

## 2025-01-11 DIAGNOSIS — G89.3 CANCER ASSOCIATED PAIN: ICD-10-CM

## 2025-01-11 DIAGNOSIS — E11.9 DIABETES MELLITUS, NEW ONSET (HCC): ICD-10-CM

## 2025-01-11 PROBLEM — J96.01 ACUTE HYPOXEMIC RESPIRATORY FAILURE: Status: ACTIVE | Noted: 2025-01-11

## 2025-01-11 LAB
ALBUMIN SERPL-MCNC: 3.6 G/DL (ref 3.5–5)
ALBUMIN/GLOB SERPL: 0.9 (ref 1.1–2.2)
ALP SERPL-CCNC: 105 U/L (ref 45–117)
ALT SERPL-CCNC: 38 U/L (ref 12–78)
ANION GAP SERPL CALC-SCNC: 10 MMOL/L (ref 2–12)
AST SERPL-CCNC: 18 U/L (ref 15–37)
BASOPHILS # BLD: 0.11 K/UL (ref 0–0.1)
BASOPHILS NFR BLD: 1 % (ref 0–1)
BILIRUB SERPL-MCNC: 0.3 MG/DL (ref 0.2–1)
BUN SERPL-MCNC: 27 MG/DL (ref 6–20)
BUN/CREAT SERPL: 17 (ref 12–20)
CALCIUM SERPL-MCNC: 8.7 MG/DL (ref 8.5–10.1)
CHLORIDE SERPL-SCNC: 99 MMOL/L (ref 97–108)
CO2 SERPL-SCNC: 26 MMOL/L (ref 21–32)
CREAT SERPL-MCNC: 1.59 MG/DL (ref 0.7–1.3)
D DIMER PPP FEU-MCNC: 0.61 MG/L FEU (ref 0–0.65)
DIFFERENTIAL METHOD BLD: ABNORMAL
EOSINOPHIL # BLD: 0.31 K/UL (ref 0–0.4)
EOSINOPHIL NFR BLD: 2.9 % (ref 0–7)
ERYTHROCYTE [DISTWIDTH] IN BLOOD BY AUTOMATED COUNT: 14 % (ref 11.5–14.5)
FLUAV RNA SPEC QL NAA+PROBE: NOT DETECTED
FLUBV RNA SPEC QL NAA+PROBE: NOT DETECTED
GLOBULIN SER CALC-MCNC: 4.2 G/DL (ref 2–4)
GLUCOSE SERPL-MCNC: 130 MG/DL (ref 65–100)
HCT VFR BLD AUTO: 36.8 % (ref 36.6–50.3)
HGB BLD-MCNC: 12.8 G/DL (ref 12.1–17)
IMM GRANULOCYTES # BLD AUTO: 0.1 K/UL (ref 0–0.04)
IMM GRANULOCYTES NFR BLD AUTO: 0.9 % (ref 0–0.5)
INR PPP: 1.1 (ref 0.9–1.1)
LYMPHOCYTES # BLD: 1.68 K/UL (ref 0.8–3.5)
LYMPHOCYTES NFR BLD: 15.7 % (ref 12–49)
MAGNESIUM SERPL-MCNC: 2 MG/DL (ref 1.6–2.4)
MCH RBC QN AUTO: 29.2 PG (ref 26–34)
MCHC RBC AUTO-ENTMCNC: 34.8 G/DL (ref 30–36.5)
MCV RBC AUTO: 83.8 FL (ref 80–99)
MONOCYTES # BLD: 0.78 K/UL (ref 0–1)
MONOCYTES NFR BLD: 7.3 % (ref 5–13)
NEUTS SEG # BLD: 7.72 K/UL (ref 1.8–8)
NEUTS SEG NFR BLD: 72.2 % (ref 32–75)
NRBC # BLD: 0 K/UL (ref 0–0.01)
NRBC BLD-RTO: 0 PER 100 WBC
NT PRO BNP: 75 PG/ML
PLATELET # BLD AUTO: 390 K/UL (ref 150–400)
PMV BLD AUTO: 8.5 FL (ref 8.9–12.9)
POTASSIUM SERPL-SCNC: 3.4 MMOL/L (ref 3.5–5.1)
PROT SERPL-MCNC: 7.8 G/DL (ref 6.4–8.2)
PROTHROMBIN TIME: 10.9 SEC (ref 9–11.1)
RBC # BLD AUTO: 4.39 M/UL (ref 4.1–5.7)
SARS-COV-2 RNA RESP QL NAA+PROBE: NOT DETECTED
SODIUM SERPL-SCNC: 135 MMOL/L (ref 136–145)
SOURCE: NORMAL
TROPONIN I SERPL HS-MCNC: 12 NG/L (ref 0–76)
WBC # BLD AUTO: 10.7 K/UL (ref 4.1–11.1)

## 2025-01-11 PROCEDURE — 6360000002 HC RX W HCPCS: Performed by: STUDENT IN AN ORGANIZED HEALTH CARE EDUCATION/TRAINING PROGRAM

## 2025-01-11 PROCEDURE — 83880 ASSAY OF NATRIURETIC PEPTIDE: CPT

## 2025-01-11 PROCEDURE — 94640 AIRWAY INHALATION TREATMENT: CPT

## 2025-01-11 PROCEDURE — 6370000000 HC RX 637 (ALT 250 FOR IP): Performed by: STUDENT IN AN ORGANIZED HEALTH CARE EDUCATION/TRAINING PROGRAM

## 2025-01-11 PROCEDURE — 85379 FIBRIN DEGRADATION QUANT: CPT

## 2025-01-11 PROCEDURE — 85610 PROTHROMBIN TIME: CPT

## 2025-01-11 PROCEDURE — 71045 X-RAY EXAM CHEST 1 VIEW: CPT

## 2025-01-11 PROCEDURE — 99291 CRITICAL CARE FIRST HOUR: CPT

## 2025-01-11 PROCEDURE — 36415 COLL VENOUS BLD VENIPUNCTURE: CPT

## 2025-01-11 PROCEDURE — 93005 ELECTROCARDIOGRAM TRACING: CPT | Performed by: STUDENT IN AN ORGANIZED HEALTH CARE EDUCATION/TRAINING PROGRAM

## 2025-01-11 PROCEDURE — 2500000003 HC RX 250 WO HCPCS: Performed by: INTERNAL MEDICINE

## 2025-01-11 PROCEDURE — 6360000004 HC RX CONTRAST MEDICATION: Performed by: RADIOLOGY

## 2025-01-11 PROCEDURE — 96374 THER/PROPH/DIAG INJ IV PUSH: CPT

## 2025-01-11 PROCEDURE — 85025 COMPLETE CBC W/AUTO DIFF WBC: CPT

## 2025-01-11 PROCEDURE — 80053 COMPREHEN METABOLIC PANEL: CPT

## 2025-01-11 PROCEDURE — 83735 ASSAY OF MAGNESIUM: CPT

## 2025-01-11 PROCEDURE — 6360000002 HC RX W HCPCS: Performed by: INTERNAL MEDICINE

## 2025-01-11 PROCEDURE — 5A09357 ASSISTANCE WITH RESPIRATORY VENTILATION, LESS THAN 24 CONSECUTIVE HOURS, CONTINUOUS POSITIVE AIRWAY PRESSURE: ICD-10-PCS | Performed by: INTERNAL MEDICINE

## 2025-01-11 PROCEDURE — 6370000000 HC RX 637 (ALT 250 FOR IP): Performed by: INTERNAL MEDICINE

## 2025-01-11 PROCEDURE — 1100000000 HC RM PRIVATE

## 2025-01-11 PROCEDURE — 71275 CT ANGIOGRAPHY CHEST: CPT

## 2025-01-11 PROCEDURE — 94660 CPAP INITIATION&MGMT: CPT

## 2025-01-11 PROCEDURE — 87636 SARSCOV2 & INF A&B AMP PRB: CPT

## 2025-01-11 PROCEDURE — 84484 ASSAY OF TROPONIN QUANT: CPT

## 2025-01-11 RX ORDER — SPIRONOLACTONE 25 MG/1
25 TABLET ORAL DAILY
Status: DISCONTINUED | OUTPATIENT
Start: 2025-01-12 | End: 2025-01-14 | Stop reason: HOSPADM

## 2025-01-11 RX ORDER — SODIUM CHLORIDE 9 MG/ML
INJECTION, SOLUTION INTRAVENOUS PRN
Status: DISCONTINUED | OUTPATIENT
Start: 2025-01-11 | End: 2025-01-14 | Stop reason: HOSPADM

## 2025-01-11 RX ORDER — LISINOPRIL 10 MG/1
10 TABLET ORAL DAILY
Status: DISCONTINUED | OUTPATIENT
Start: 2025-01-12 | End: 2025-01-14 | Stop reason: HOSPADM

## 2025-01-11 RX ORDER — ACETAMINOPHEN 650 MG/1
650 SUPPOSITORY RECTAL EVERY 6 HOURS PRN
Status: DISCONTINUED | OUTPATIENT
Start: 2025-01-11 | End: 2025-01-14

## 2025-01-11 RX ORDER — ATORVASTATIN CALCIUM 10 MG/1
10 TABLET, FILM COATED ORAL NIGHTLY
Status: DISCONTINUED | OUTPATIENT
Start: 2025-01-11 | End: 2025-01-14 | Stop reason: HOSPADM

## 2025-01-11 RX ORDER — FUROSEMIDE 10 MG/ML
20 INJECTION INTRAMUSCULAR; INTRAVENOUS ONCE
Status: COMPLETED | OUTPATIENT
Start: 2025-01-11 | End: 2025-01-11

## 2025-01-11 RX ORDER — IPRATROPIUM BROMIDE AND ALBUTEROL SULFATE 2.5; .5 MG/3ML; MG/3ML
1 SOLUTION RESPIRATORY (INHALATION)
Status: COMPLETED | OUTPATIENT
Start: 2025-01-11 | End: 2025-01-11

## 2025-01-11 RX ORDER — IPRATROPIUM BROMIDE AND ALBUTEROL SULFATE 2.5; .5 MG/3ML; MG/3ML
1 SOLUTION RESPIRATORY (INHALATION)
Status: DISCONTINUED | OUTPATIENT
Start: 2025-01-11 | End: 2025-01-13

## 2025-01-11 RX ORDER — ENOXAPARIN SODIUM 100 MG/ML
30 INJECTION SUBCUTANEOUS 2 TIMES DAILY
Status: DISCONTINUED | OUTPATIENT
Start: 2025-01-11 | End: 2025-01-14 | Stop reason: HOSPADM

## 2025-01-11 RX ORDER — SODIUM CHLORIDE 0.9 % (FLUSH) 0.9 %
5-40 SYRINGE (ML) INJECTION PRN
Status: DISCONTINUED | OUTPATIENT
Start: 2025-01-11 | End: 2025-01-14 | Stop reason: HOSPADM

## 2025-01-11 RX ORDER — DOXYCYCLINE HYCLATE 100 MG
100 TABLET ORAL EVERY 12 HOURS SCHEDULED
Status: DISCONTINUED | OUTPATIENT
Start: 2025-01-11 | End: 2025-01-14 | Stop reason: HOSPADM

## 2025-01-11 RX ORDER — ACETAMINOPHEN 325 MG/1
650 TABLET ORAL EVERY 6 HOURS PRN
Status: DISCONTINUED | OUTPATIENT
Start: 2025-01-11 | End: 2025-01-14

## 2025-01-11 RX ORDER — IOPAMIDOL 755 MG/ML
100 INJECTION, SOLUTION INTRAVASCULAR
Status: COMPLETED | OUTPATIENT
Start: 2025-01-11 | End: 2025-01-11

## 2025-01-11 RX ORDER — GUAIFENESIN 600 MG/1
600 TABLET, EXTENDED RELEASE ORAL 2 TIMES DAILY
Status: DISCONTINUED | OUTPATIENT
Start: 2025-01-11 | End: 2025-01-14 | Stop reason: HOSPADM

## 2025-01-11 RX ORDER — ONDANSETRON 2 MG/ML
4 INJECTION INTRAMUSCULAR; INTRAVENOUS EVERY 4 HOURS PRN
Status: DISCONTINUED | OUTPATIENT
Start: 2025-01-11 | End: 2025-01-14 | Stop reason: HOSPADM

## 2025-01-11 RX ORDER — SODIUM CHLORIDE 0.9 % (FLUSH) 0.9 %
5-40 SYRINGE (ML) INJECTION EVERY 12 HOURS SCHEDULED
Status: DISCONTINUED | OUTPATIENT
Start: 2025-01-11 | End: 2025-01-14 | Stop reason: HOSPADM

## 2025-01-11 RX ORDER — POLYETHYLENE GLYCOL 3350 17 G/17G
17 POWDER, FOR SOLUTION ORAL DAILY PRN
Status: DISCONTINUED | OUTPATIENT
Start: 2025-01-11 | End: 2025-01-14 | Stop reason: HOSPADM

## 2025-01-11 RX ORDER — FUROSEMIDE 10 MG/ML
20 INJECTION INTRAMUSCULAR; INTRAVENOUS DAILY
Status: DISCONTINUED | OUTPATIENT
Start: 2025-01-12 | End: 2025-01-14 | Stop reason: HOSPADM

## 2025-01-11 RX ORDER — ACETAMINOPHEN 325 MG/1
650 TABLET ORAL EVERY 4 HOURS PRN
Status: DISCONTINUED | OUTPATIENT
Start: 2025-01-11 | End: 2025-01-11 | Stop reason: SDUPTHER

## 2025-01-11 RX ADMIN — ENOXAPARIN SODIUM 30 MG: 100 INJECTION SUBCUTANEOUS at 20:42

## 2025-01-11 RX ADMIN — IPRATROPIUM BROMIDE AND ALBUTEROL SULFATE 1 DOSE: .5; 3 SOLUTION RESPIRATORY (INHALATION) at 20:42

## 2025-01-11 RX ADMIN — DOXYCYCLINE HYCLATE 100 MG: 100 TABLET, COATED ORAL at 20:41

## 2025-01-11 RX ADMIN — WATER 1000 MG: 1 INJECTION INTRAMUSCULAR; INTRAVENOUS; SUBCUTANEOUS at 18:54

## 2025-01-11 RX ADMIN — WATER 40 MG: 1 INJECTION INTRAMUSCULAR; INTRAVENOUS; SUBCUTANEOUS at 18:59

## 2025-01-11 RX ADMIN — FUROSEMIDE 20 MG: 10 INJECTION, SOLUTION INTRAMUSCULAR; INTRAVENOUS at 17:59

## 2025-01-11 RX ADMIN — IPRATROPIUM BROMIDE AND ALBUTEROL SULFATE 1 DOSE: .5; 3 SOLUTION RESPIRATORY (INHALATION) at 15:27

## 2025-01-11 RX ADMIN — GUAIFENESIN 600 MG: 600 TABLET ORAL at 20:42

## 2025-01-11 RX ADMIN — ATORVASTATIN CALCIUM 10 MG: 10 TABLET, FILM COATED ORAL at 20:41

## 2025-01-11 RX ADMIN — SODIUM CHLORIDE, PRESERVATIVE FREE 10 ML: 5 INJECTION INTRAVENOUS at 20:42

## 2025-01-11 RX ADMIN — IOPAMIDOL 100 ML: 755 INJECTION, SOLUTION INTRAVENOUS at 16:35

## 2025-01-11 ASSESSMENT — PAIN SCALES - GENERAL: PAINLEVEL_OUTOF10: 0

## 2025-01-11 NOTE — H&P
Hospitalist Admission Note    NAME: Francis Sierra   :  1965   MRN:  [id number]     Date/Time:  2025 5:26 PM    Patient PCP: Bhavna Swenson APRN - NP  ______________________________________________________________________  Given the patient's current clinical presentation, I have a high level of concern for decompensation if discharged from the emergency department.  Given the patient's current clinical presentation, I have a high level of concern for decompensation if patient is discharged from the emergency department.  Patient will be admitted as an inpatient with an estimated LOS of 2 days    My assessment of this patient's clinical condition and my plan of care is as follows.    Assessment / Plan:    Acute hypoxic respiratory failure  Post obstructive PNA  Right hilar mass  Right breast mass x 2 months  Metastatic cancer  GILDA on home BIPAP  Former smoker, quit 5 years ago  -CTA of chest:   1.  No pulmonary embolism.  2.  Right breast mass, concerning for malignancy.  3.  Right hilar mass, concerning for malignancy.  4.  Enlarged AP window lymph node, concerning for metastasis.  5.  Scattered lytic osseous lesions, concerning for metastases.  6.  Left adrenal mass, cannot exclude metastasis.    -start empiric rocephin and doxycycline. Reviewed CT with Dr Hernández and there is evidence of air space disease, suspect post obstructive PNA  -scheduled IV steroids  -scheduled duonebs  -continue BIPAP at HS and prn during the day  -oxygen prn  -Consult oncology in AM  -Consult [facility] - Discussed with Dr Hernández.  Will request IR on Monday to assess patient for biopsy of either his right breast mass or one of the osseous lesions    Chronic systolic heart failure  -Most recent echo 2023: EF 60-65%, no AS, no MR  -switch to IV lasix  -continue lisinopril and spironolactone    Hypertension  -hold amlodipine.  Follow BP  -IV hydralazine as

## 2025-01-11 NOTE — ED NOTES
Per MD pt can go off Bipap at this time. Pt resting on stretcher, Respirations unlabored SPO2 94% on RA

## 2025-01-11 NOTE — ED PROVIDER NOTES
[facility] EMERGENCY DEPARTMENT  EMERGENCY DEPARTMENT ENCOUNTER       Pt Name: Francis Sierra  MRN: [id number]  Birthdate 1965  Date of evaluation: 1/11/2025  Provider: Jorge Clark MD   PCP: Bhavna Swenson APRN - NP  Note Started: 3:04 PM EST 1/11/25     CHIEF COMPLAINT       Chief Complaint   Patient presents with    Shortness of Breath     Off and on x 2 days        HISTORY OF PRESENT ILLNESS: 1 or more elements      History From: Patient  HPI Limitations: None     Francis Sierra is a 59 y.o. male who presents with a chief complaint of difficulty breathing that started a couple of days ago. The patient denies any chest pain associated with the breathing difficulty. The patient has a history of \"heart problems\" but reports that they were supposedly resolved. The patient is currently taking diuretics and denies any diagnosis of heart failure.  Does take medications for blood pressure.    The patient denies any recent fever, sore throat, or other signs of illness. The patient has no history of blood clots, COPD, or asthma. The patient quit smoking approximately 5 years ago and denies any history of diabetes. The patient reports leg swelling, which they attribute to water retention. The patient experiences increased difficulty breathing in certain positions.  He reports orthopnea, dyspnea on exertion.     Nursing Notes were all reviewed and agreed with or any disagreements were addressed in the HPI.     REVIEW OF SYSTEMS      Review of Systems     Positives and Pertinent negatives as per HPI.    PAST HISTORY     Past Medical History:  Past Medical History:   Diagnosis Date    Alcohol abuse     Former smoker     Hypercholesterolemia     Hypertension     GILDA (obstructive sleep apnea)     Prediabetes     Pulmonary hypertension (HCC)     Right heart failure with reduced right ventricular function (HCC)          Past Surgical History:  No past surgical history on file.    Family

## 2025-01-12 LAB
ANION GAP SERPL CALC-SCNC: 10 MMOL/L (ref 2–12)
ANION GAP SERPL CALC-SCNC: 7 MMOL/L (ref 2–12)
BASOPHILS # BLD: 0.03 K/UL (ref 0–0.1)
BASOPHILS NFR BLD: 0.3 % (ref 0–1)
BUN SERPL-MCNC: 27 MG/DL (ref 6–20)
BUN SERPL-MCNC: 39 MG/DL (ref 6–20)
BUN/CREAT SERPL: 17 (ref 12–20)
BUN/CREAT SERPL: 18 (ref 12–20)
CALCIUM SERPL-MCNC: 8.9 MG/DL (ref 8.5–10.1)
CALCIUM SERPL-MCNC: 9.1 MG/DL (ref 8.5–10.1)
CHLORIDE SERPL-SCNC: 96 MMOL/L (ref 97–108)
CHLORIDE SERPL-SCNC: 99 MMOL/L (ref 97–108)
CO2 SERPL-SCNC: 24 MMOL/L (ref 21–32)
CO2 SERPL-SCNC: 27 MMOL/L (ref 21–32)
CREAT SERPL-MCNC: 1.62 MG/DL (ref 0.7–1.3)
CREAT SERPL-MCNC: 2.15 MG/DL (ref 0.7–1.3)
DIFFERENTIAL METHOD BLD: ABNORMAL
EKG ATRIAL RATE: 107 BPM
EKG DIAGNOSIS: NORMAL
EKG P AXIS: 48 DEGREES
EKG P-R INTERVAL: 188 MS
EKG Q-T INTERVAL: 330 MS
EKG QRS DURATION: 102 MS
EKG QTC CALCULATION (BAZETT): 440 MS
EKG R AXIS: 77 DEGREES
EKG T AXIS: 66 DEGREES
EKG VENTRICULAR RATE: 107 BPM
EOSINOPHIL # BLD: 0 K/UL (ref 0–0.4)
EOSINOPHIL NFR BLD: 0 % (ref 0–7)
ERYTHROCYTE [DISTWIDTH] IN BLOOD BY AUTOMATED COUNT: 14 % (ref 11.5–14.5)
GLUCOSE BLD STRIP.AUTO-MCNC: 301 MG/DL (ref 65–117)
GLUCOSE BLD STRIP.AUTO-MCNC: 315 MG/DL (ref 65–117)
GLUCOSE SERPL-MCNC: 202 MG/DL (ref 65–100)
GLUCOSE SERPL-MCNC: 353 MG/DL (ref 65–100)
HCT VFR BLD AUTO: 37.1 % (ref 36.6–50.3)
HGB BLD-MCNC: 12.7 G/DL (ref 12.1–17)
IMM GRANULOCYTES # BLD AUTO: 0.1 K/UL (ref 0–0.04)
IMM GRANULOCYTES NFR BLD AUTO: 0.9 % (ref 0–0.5)
LYMPHOCYTES # BLD: 0.6 K/UL (ref 0.8–3.5)
LYMPHOCYTES NFR BLD: 5.7 % (ref 12–49)
MAGNESIUM SERPL-MCNC: 2.1 MG/DL (ref 1.6–2.4)
MCH RBC QN AUTO: 29.3 PG (ref 26–34)
MCHC RBC AUTO-ENTMCNC: 34.2 G/DL (ref 30–36.5)
MCV RBC AUTO: 85.7 FL (ref 80–99)
MONOCYTES # BLD: 0.1 K/UL (ref 0–1)
MONOCYTES NFR BLD: 0.9 % (ref 5–13)
NEUTS SEG # BLD: 9.77 K/UL (ref 1.8–8)
NEUTS SEG NFR BLD: 92.2 % (ref 32–75)
NRBC # BLD: 0 K/UL (ref 0–0.01)
NRBC BLD-RTO: 0 PER 100 WBC
NT PRO BNP: 61 PG/ML
PLATELET # BLD AUTO: 404 K/UL (ref 150–400)
PMV BLD AUTO: 8.6 FL (ref 8.9–12.9)
POTASSIUM SERPL-SCNC: 4.1 MMOL/L (ref 3.5–5.1)
POTASSIUM SERPL-SCNC: 4.3 MMOL/L (ref 3.5–5.1)
RBC # BLD AUTO: 4.33 M/UL (ref 4.1–5.7)
RBC MORPH BLD: ABNORMAL
SERVICE CMNT-IMP: ABNORMAL
SERVICE CMNT-IMP: ABNORMAL
SODIUM SERPL-SCNC: 130 MMOL/L (ref 136–145)
SODIUM SERPL-SCNC: 133 MMOL/L (ref 136–145)
WBC # BLD AUTO: 10.6 K/UL (ref 4.1–11.1)

## 2025-01-12 PROCEDURE — 85025 COMPLETE CBC W/AUTO DIFF WBC: CPT

## 2025-01-12 PROCEDURE — 6370000000 HC RX 637 (ALT 250 FOR IP): Performed by: INTERNAL MEDICINE

## 2025-01-12 PROCEDURE — 2060000000 HC ICU INTERMEDIATE R&B

## 2025-01-12 PROCEDURE — 99222 1ST HOSP IP/OBS MODERATE 55: CPT | Performed by: INTERNAL MEDICINE

## 2025-01-12 PROCEDURE — 0HBT3ZX EXCISION OF RIGHT BREAST, PERCUTANEOUS APPROACH, DIAGNOSTIC: ICD-10-PCS | Performed by: INTERNAL MEDICINE

## 2025-01-12 PROCEDURE — 6360000002 HC RX W HCPCS: Performed by: INTERNAL MEDICINE

## 2025-01-12 PROCEDURE — 83880 ASSAY OF NATRIURETIC PEPTIDE: CPT

## 2025-01-12 PROCEDURE — 94660 CPAP INITIATION&MGMT: CPT

## 2025-01-12 PROCEDURE — 2500000003 HC RX 250 WO HCPCS: Performed by: INTERNAL MEDICINE

## 2025-01-12 PROCEDURE — 6370000000 HC RX 637 (ALT 250 FOR IP): Performed by: HOSPITALIST

## 2025-01-12 PROCEDURE — 80048 BASIC METABOLIC PNL TOTAL CA: CPT

## 2025-01-12 PROCEDURE — 94640 AIRWAY INHALATION TREATMENT: CPT

## 2025-01-12 PROCEDURE — 83735 ASSAY OF MAGNESIUM: CPT

## 2025-01-12 PROCEDURE — 82962 GLUCOSE BLOOD TEST: CPT

## 2025-01-12 PROCEDURE — 2700000000 HC OXYGEN THERAPY PER DAY

## 2025-01-12 PROCEDURE — 36415 COLL VENOUS BLD VENIPUNCTURE: CPT

## 2025-01-12 RX ORDER — OXYCODONE AND ACETAMINOPHEN 5; 325 MG/1; MG/1
2 TABLET ORAL EVERY 4 HOURS PRN
Status: DISCONTINUED | OUTPATIENT
Start: 2025-01-12 | End: 2025-01-14

## 2025-01-12 RX ORDER — INSULIN LISPRO 100 [IU]/ML
0-8 INJECTION, SOLUTION INTRAVENOUS; SUBCUTANEOUS
Status: DISCONTINUED | OUTPATIENT
Start: 2025-01-12 | End: 2025-01-14 | Stop reason: HOSPADM

## 2025-01-12 RX ORDER — PREDNISONE 5 MG/1
5 TABLET ORAL DAILY
Status: DISCONTINUED | OUTPATIENT
Start: 2025-01-12 | End: 2025-01-12

## 2025-01-12 RX ORDER — PREDNISONE 20 MG/1
20 TABLET ORAL 2 TIMES DAILY
Status: DISCONTINUED | OUTPATIENT
Start: 2025-01-12 | End: 2025-01-14

## 2025-01-12 RX ORDER — INSULIN GLARGINE 100 [IU]/ML
0.25 INJECTION, SOLUTION SUBCUTANEOUS DAILY
Status: DISCONTINUED | OUTPATIENT
Start: 2025-01-12 | End: 2025-01-14 | Stop reason: HOSPADM

## 2025-01-12 RX ORDER — GLUCAGON 1 MG/ML
1 KIT INJECTION PRN
Status: DISCONTINUED | OUTPATIENT
Start: 2025-01-12 | End: 2025-01-14 | Stop reason: HOSPADM

## 2025-01-12 RX ORDER — DEXTROSE MONOHYDRATE 100 MG/ML
INJECTION, SOLUTION INTRAVENOUS CONTINUOUS PRN
Status: DISCONTINUED | OUTPATIENT
Start: 2025-01-12 | End: 2025-01-14 | Stop reason: HOSPADM

## 2025-01-12 RX ORDER — OXYCODONE AND ACETAMINOPHEN 5; 325 MG/1; MG/1
1 TABLET ORAL EVERY 4 HOURS PRN
Status: DISCONTINUED | OUTPATIENT
Start: 2025-01-12 | End: 2025-01-14

## 2025-01-12 RX ADMIN — IPRATROPIUM BROMIDE AND ALBUTEROL SULFATE 1 DOSE: .5; 3 SOLUTION RESPIRATORY (INHALATION) at 20:36

## 2025-01-12 RX ADMIN — INSULIN LISPRO 6 UNITS: 100 INJECTION, SOLUTION INTRAVENOUS; SUBCUTANEOUS at 21:38

## 2025-01-12 RX ADMIN — WATER 40 MG: 1 INJECTION INTRAMUSCULAR; INTRAVENOUS; SUBCUTANEOUS at 00:40

## 2025-01-12 RX ADMIN — DOXYCYCLINE HYCLATE 100 MG: 100 TABLET, COATED ORAL at 09:02

## 2025-01-12 RX ADMIN — PREDNISONE 20 MG: 20 TABLET ORAL at 19:52

## 2025-01-12 RX ADMIN — IPRATROPIUM BROMIDE AND ALBUTEROL SULFATE 1 DOSE: .5; 3 SOLUTION RESPIRATORY (INHALATION) at 15:05

## 2025-01-12 RX ADMIN — LISINOPRIL 10 MG: 10 TABLET ORAL at 09:03

## 2025-01-12 RX ADMIN — GUAIFENESIN 600 MG: 600 TABLET ORAL at 19:51

## 2025-01-12 RX ADMIN — INSULIN GLARGINE 34 UNITS: 100 INJECTION, SOLUTION SUBCUTANEOUS at 17:27

## 2025-01-12 RX ADMIN — DOXYCYCLINE HYCLATE 100 MG: 100 TABLET, COATED ORAL at 19:51

## 2025-01-12 RX ADMIN — ACETAMINOPHEN 650 MG: 325 TABLET ORAL at 11:51

## 2025-01-12 RX ADMIN — SODIUM CHLORIDE, PRESERVATIVE FREE 10 ML: 5 INJECTION INTRAVENOUS at 19:54

## 2025-01-12 RX ADMIN — SODIUM CHLORIDE, PRESERVATIVE FREE 10 ML: 5 INJECTION INTRAVENOUS at 09:04

## 2025-01-12 RX ADMIN — FUROSEMIDE 20 MG: 10 INJECTION, SOLUTION INTRAMUSCULAR; INTRAVENOUS at 09:03

## 2025-01-12 RX ADMIN — IPRATROPIUM BROMIDE AND ALBUTEROL SULFATE 1 DOSE: .5; 3 SOLUTION RESPIRATORY (INHALATION) at 07:55

## 2025-01-12 RX ADMIN — OXYCODONE HYDROCHLORIDE AND ACETAMINOPHEN 0.5 TABLET: 5; 325 TABLET ORAL at 19:52

## 2025-01-12 RX ADMIN — IPRATROPIUM BROMIDE AND ALBUTEROL SULFATE 1 DOSE: .5; 3 SOLUTION RESPIRATORY (INHALATION) at 11:29

## 2025-01-12 RX ADMIN — WATER 1000 MG: 1 INJECTION INTRAMUSCULAR; INTRAVENOUS; SUBCUTANEOUS at 17:26

## 2025-01-12 RX ADMIN — SPIRONOLACTONE 25 MG: 25 TABLET ORAL at 09:03

## 2025-01-12 RX ADMIN — GUAIFENESIN 600 MG: 600 TABLET ORAL at 09:03

## 2025-01-12 RX ADMIN — ENOXAPARIN SODIUM 30 MG: 100 INJECTION SUBCUTANEOUS at 09:04

## 2025-01-12 RX ADMIN — WATER 40 MG: 1 INJECTION INTRAMUSCULAR; INTRAVENOUS; SUBCUTANEOUS at 09:03

## 2025-01-12 RX ADMIN — ATORVASTATIN CALCIUM 10 MG: 10 TABLET, FILM COATED ORAL at 19:51

## 2025-01-12 ASSESSMENT — PAIN SCALES - GENERAL
PAINLEVEL_OUTOF10: 3
PAINLEVEL_OUTOF10: 7
PAINLEVEL_OUTOF10: 0

## 2025-01-12 ASSESSMENT — PAIN DESCRIPTION - DESCRIPTORS
DESCRIPTORS: ACHING
DESCRIPTORS: ACHING

## 2025-01-12 ASSESSMENT — PAIN DESCRIPTION - LOCATION
LOCATION: GENERALIZED
LOCATION: BREAST;CHEST

## 2025-01-12 ASSESSMENT — PAIN DESCRIPTION - ORIENTATION: ORIENTATION: RIGHT

## 2025-01-12 NOTE — PLAN OF CARE
Problem: Discharge Planning  Goal: Discharge to home or other facility with appropriate resources  Outcome: Progressing     Problem: Pain  Goal: Verbalizes/displays adequate comfort level or baseline comfort level  Outcome: Progressing     Problem: Safety - Adult  Goal: Free from fall injury  Outcome: Progressing     Problem: Respiratory - Adult  Goal: Achieves optimal ventilation and oxygenation  1/12/2025 0811 by Adams Boothe RN  Outcome: Progressing  1/12/2025 0800 by Perlita Gar RCP  Outcome: Progressing

## 2025-01-12 NOTE — ED NOTES
Bedside and Verbal shift change report given to Nupur RN (oncoming nurse) by Mike RN (offgoing nurse). Report included the following information ED Encounter Summary, ED SBAR, MAR, and Recent Results.

## 2025-01-12 NOTE — ED NOTES
TRANSFER - OUT REPORT:    Verbal report given to Mark on Francis Sierra  being transferred to 2138 for routine progression of patient care       Report consisted of patient's Situation, Background, Assessment and   Recommendations(SBAR).     Information from the following report(s) Nurse Handoff Report, ED SBAR, MAR, Recent Results, Med Rec Status, Cardiac Rhythm  , Alarm Parameters, and Event Log was reviewed with the receiving nurse.    Hazlet Fall Assessment:    Presents to emergency department  because of falls (Syncope, seizure, or loss of consciousness): No  Age > 70: No  Altered Mental Status, Intoxication with alcohol or substance confusion (Disorientation, impaired judgment, poor safety awaremess, or inability to follow instructions): No  Impaired Mobility: Ambulates or transfers with assistive devices or assistance; Unable to ambulate or transer.: No  Nursing Judgement: No          Lines:   Peripheral IV 01/11/25 Right Antecubital (Active)        Opportunity for questions and clarification was provided.      Patient transported with:  Monitor, bipap, RN

## 2025-01-13 ENCOUNTER — APPOINTMENT (OUTPATIENT)
Facility: HOSPITAL | Age: 60
DRG: 382 | End: 2025-01-13
Payer: COMMERCIAL

## 2025-01-13 ENCOUNTER — APPOINTMENT (OUTPATIENT)
Facility: HOSPITAL | Age: 60
DRG: 382 | End: 2025-01-13
Attending: INTERNAL MEDICINE
Payer: COMMERCIAL

## 2025-01-13 LAB
BASOPHILS # BLD: 0.03 K/UL (ref 0–0.1)
BASOPHILS NFR BLD: 0.2 % (ref 0–1)
DIFFERENTIAL METHOD BLD: ABNORMAL
EOSINOPHIL # BLD: 0 K/UL (ref 0–0.4)
EOSINOPHIL NFR BLD: 0 % (ref 0–7)
ERYTHROCYTE [DISTWIDTH] IN BLOOD BY AUTOMATED COUNT: 14.4 % (ref 11.5–14.5)
EST. AVERAGE GLUCOSE BLD GHB EST-MCNC: 151 MG/DL
GLUCOSE BLD STRIP.AUTO-MCNC: 149 MG/DL (ref 65–117)
GLUCOSE BLD STRIP.AUTO-MCNC: 163 MG/DL (ref 65–117)
GLUCOSE BLD STRIP.AUTO-MCNC: 175 MG/DL (ref 65–117)
GLUCOSE BLD STRIP.AUTO-MCNC: 189 MG/DL (ref 65–117)
HBA1C MFR BLD: 6.9 % (ref 4–5.6)
HCT VFR BLD AUTO: 37 % (ref 36.6–50.3)
HGB BLD-MCNC: 12.3 G/DL (ref 12.1–17)
IMM GRANULOCYTES # BLD AUTO: 0.27 K/UL (ref 0–0.04)
IMM GRANULOCYTES NFR BLD AUTO: 1.4 % (ref 0–0.5)
LYMPHOCYTES # BLD: 0.98 K/UL (ref 0.8–3.5)
LYMPHOCYTES NFR BLD: 5.2 % (ref 12–49)
MCH RBC QN AUTO: 29.4 PG (ref 26–34)
MCHC RBC AUTO-ENTMCNC: 33.2 G/DL (ref 30–36.5)
MCV RBC AUTO: 88.5 FL (ref 80–99)
MONOCYTES # BLD: 0.99 K/UL (ref 0–1)
MONOCYTES NFR BLD: 5.2 % (ref 5–13)
NEUTS SEG # BLD: 16.61 K/UL (ref 1.8–8)
NEUTS SEG NFR BLD: 88 % (ref 32–75)
NRBC # BLD: 0 K/UL (ref 0–0.01)
NRBC BLD-RTO: 0 PER 100 WBC
PLATELET # BLD AUTO: 432 K/UL (ref 150–400)
PMV BLD AUTO: 9 FL (ref 8.9–12.9)
RBC # BLD AUTO: 4.18 M/UL (ref 4.1–5.7)
SERVICE CMNT-IMP: ABNORMAL
WBC # BLD AUTO: 18.9 K/UL (ref 4.1–11.1)

## 2025-01-13 PROCEDURE — 88305 TISSUE EXAM BY PATHOLOGIST: CPT

## 2025-01-13 PROCEDURE — 88342 IMHCHEM/IMCYTCHM 1ST ANTB: CPT

## 2025-01-13 PROCEDURE — 2500000003 HC RX 250 WO HCPCS: Performed by: INTERNAL MEDICINE

## 2025-01-13 PROCEDURE — 6370000000 HC RX 637 (ALT 250 FOR IP): Performed by: INTERNAL MEDICINE

## 2025-01-13 PROCEDURE — 77065 DX MAMMO INCL CAD UNI: CPT

## 2025-01-13 PROCEDURE — 85025 COMPLETE CBC W/AUTO DIFF WBC: CPT

## 2025-01-13 PROCEDURE — 88360 TUMOR IMMUNOHISTOCHEM/MANUAL: CPT

## 2025-01-13 PROCEDURE — 83036 HEMOGLOBIN GLYCOSYLATED A1C: CPT

## 2025-01-13 PROCEDURE — 36415 COLL VENOUS BLD VENIPUNCTURE: CPT

## 2025-01-13 PROCEDURE — 99232 SBSQ HOSP IP/OBS MODERATE 35: CPT | Performed by: INTERNAL MEDICINE

## 2025-01-13 PROCEDURE — 94640 AIRWAY INHALATION TREATMENT: CPT

## 2025-01-13 PROCEDURE — 82962 GLUCOSE BLOOD TEST: CPT

## 2025-01-13 PROCEDURE — 6360000002 HC RX W HCPCS: Performed by: INTERNAL MEDICINE

## 2025-01-13 PROCEDURE — 2709999900 US BREAST BIOPSY W LOC DEVICE 1ST LESION RIGHT

## 2025-01-13 PROCEDURE — 2060000000 HC ICU INTERMEDIATE R&B

## 2025-01-13 PROCEDURE — 94660 CPAP INITIATION&MGMT: CPT

## 2025-01-13 PROCEDURE — 88341 IMHCHEM/IMCYTCHM EA ADD ANTB: CPT

## 2025-01-13 PROCEDURE — 6370000000 HC RX 637 (ALT 250 FOR IP): Performed by: HOSPITALIST

## 2025-01-13 RX ORDER — IPRATROPIUM BROMIDE AND ALBUTEROL SULFATE 2.5; .5 MG/3ML; MG/3ML
1 SOLUTION RESPIRATORY (INHALATION) EVERY 4 HOURS PRN
Status: DISCONTINUED | OUTPATIENT
Start: 2025-01-13 | End: 2025-01-14 | Stop reason: HOSPADM

## 2025-01-13 RX ADMIN — GUAIFENESIN 600 MG: 600 TABLET ORAL at 21:42

## 2025-01-13 RX ADMIN — WATER 1000 MG: 1 INJECTION INTRAMUSCULAR; INTRAVENOUS; SUBCUTANEOUS at 17:39

## 2025-01-13 RX ADMIN — IPRATROPIUM BROMIDE AND ALBUTEROL SULFATE 1 DOSE: .5; 3 SOLUTION RESPIRATORY (INHALATION) at 23:01

## 2025-01-13 RX ADMIN — PREDNISONE 20 MG: 20 TABLET ORAL at 21:43

## 2025-01-13 RX ADMIN — SODIUM CHLORIDE, PRESERVATIVE FREE 10 ML: 5 INJECTION INTRAVENOUS at 21:44

## 2025-01-13 RX ADMIN — DOXYCYCLINE HYCLATE 100 MG: 100 TABLET, COATED ORAL at 09:12

## 2025-01-13 RX ADMIN — OXYCODONE HYDROCHLORIDE AND ACETAMINOPHEN 0.5 TABLET: 5; 325 TABLET ORAL at 02:07

## 2025-01-13 RX ADMIN — OXYCODONE HYDROCHLORIDE AND ACETAMINOPHEN 1 TABLET: 5; 325 TABLET ORAL at 09:26

## 2025-01-13 RX ADMIN — OXYCODONE HYDROCHLORIDE AND ACETAMINOPHEN 1 TABLET: 5; 325 TABLET ORAL at 18:40

## 2025-01-13 RX ADMIN — GUAIFENESIN 600 MG: 600 TABLET ORAL at 09:11

## 2025-01-13 RX ADMIN — DOXYCYCLINE HYCLATE 100 MG: 100 TABLET, COATED ORAL at 21:43

## 2025-01-13 RX ADMIN — FUROSEMIDE 20 MG: 10 INJECTION, SOLUTION INTRAMUSCULAR; INTRAVENOUS at 09:10

## 2025-01-13 RX ADMIN — SPIRONOLACTONE 25 MG: 25 TABLET ORAL at 09:12

## 2025-01-13 RX ADMIN — INSULIN LISPRO 2 UNITS: 100 INJECTION, SOLUTION INTRAVENOUS; SUBCUTANEOUS at 21:44

## 2025-01-13 RX ADMIN — PREDNISONE 20 MG: 20 TABLET ORAL at 09:13

## 2025-01-13 RX ADMIN — ATORVASTATIN CALCIUM 10 MG: 10 TABLET, FILM COATED ORAL at 21:43

## 2025-01-13 RX ADMIN — SODIUM CHLORIDE, PRESERVATIVE FREE 10 ML: 5 INJECTION INTRAVENOUS at 09:13

## 2025-01-13 RX ADMIN — INSULIN GLARGINE 34 UNITS: 100 INJECTION, SOLUTION SUBCUTANEOUS at 09:09

## 2025-01-13 RX ADMIN — LISINOPRIL 10 MG: 10 TABLET ORAL at 09:11

## 2025-01-13 ASSESSMENT — PAIN SCALES - GENERAL
PAINLEVEL_OUTOF10: 0
PAINLEVEL_OUTOF10: 0
PAINLEVEL_OUTOF10: 2
PAINLEVEL_OUTOF10: 4
PAINLEVEL_OUTOF10: 5
PAINLEVEL_OUTOF10: 4
PAINLEVEL_OUTOF10: 2
PAINLEVEL_OUTOF10: 7

## 2025-01-13 ASSESSMENT — PAIN DESCRIPTION - LOCATION
LOCATION: BREAST;CHEST
LOCATION: BACK

## 2025-01-13 ASSESSMENT — PAIN DESCRIPTION - DESCRIPTORS
DESCRIPTORS: DULL
DESCRIPTORS: DULL
DESCRIPTORS: ACHING
DESCRIPTORS: ACHING
DESCRIPTORS: DULL

## 2025-01-13 ASSESSMENT — PAIN DESCRIPTION - ORIENTATION
ORIENTATION: RIGHT
ORIENTATION: LOWER
ORIENTATION: RIGHT
ORIENTATION: LOWER

## 2025-01-13 ASSESSMENT — PAIN DESCRIPTION - PAIN TYPE
TYPE: ACUTE PAIN

## 2025-01-13 NOTE — PLAN OF CARE
Problem: Discharge Planning  Goal: Discharge to home or other facility with appropriate resources  1/12/2025 0811 by Adams Boothe RN  Outcome: Progressing     Problem: Pain  Goal: Verbalizes/displays adequate comfort level or baseline comfort level  1/12/2025 0811 by Adams Boothe RN  Outcome: Progressing     Problem: Safety - Adult  Goal: Free from fall injury  1/12/2025 2150 by Anjali Grace RN  Outcome: Progressing  1/12/2025 0811 by Adams Boothe RN  Outcome: Progressing     Problem: Respiratory - Adult  Goal: Achieves optimal ventilation and oxygenation  1/12/2025 0811 by Adams Boothe RN  Outcome: Progressing  1/12/2025 0800 by Perlita Gar RCP  Outcome: Progressing

## 2025-01-13 NOTE — PLAN OF CARE
Predictive Model Details          29 (Normal)  Factor Value    Calculated 1/13/2025 08:42 34% Age 59 years old    Deterioration Index Model 14% Cardiac rhythm Sinus tachy     12% WBC count abnormal (18.9 K/uL)     11% Sodium abnormal (130 mmol/L)     9% Systolic 153     8% Respiratory rate 18     4% Pulse 95     3% BUN abnormal (39 MG/DL)     2% Platelet count abnormal (432 K/uL)     1% Pulse oximetry 94 %     1% Potassium 4.1 mmol/L     0% Hematocrit 37.0 %     0% Temperature 98.1 °F (36.7 °C)       Problem: Discharge Planning  Goal: Discharge to home or other facility with appropriate resources  Outcome: Progressing     Problem: Pain  Goal: Verbalizes/displays adequate comfort level or baseline comfort level  Outcome: Progressing  Flowsheets (Taken 1/13/2025 0842)  Verbalizes/displays adequate comfort level or baseline comfort level:   Notify Licensed Independent Practitioner if interventions unsuccessful or patient reports new pain   Administer analgesics based on type and severity of pain and evaluate response   Encourage patient to monitor pain and request assistance     Problem: Respiratory - Adult  Goal: Achieves optimal ventilation and oxygenation  Outcome: Progressing

## 2025-01-13 NOTE — PLAN OF CARE
Predictive Model Details   Calculating. Refresh or re-add the SmartLink.      Problem: Discharge Planning  Goal: Discharge to home or other facility with appropriate resources  1/13/2025 0928 by Geneva Mcneal RN  Outcome: Progressing  1/13/2025 0842 by Geneva Mcneal RN  Outcome: Progressing     Problem: ABCDS Injury Assessment  Goal: Absence of physical injury  1/13/2025 0928 by Geneva Mcneal, RN  Outcome: Progressing  1/13/2025 0842 by Geneva Mcneal RN  Outcome: Progressing     Problem: Safety - Adult  Goal: Free from fall injury  1/13/2025 0928 by Geneva Mcneal RN  Outcome: Progressing  1/13/2025 0842 by Geneva Mcneal RN  Outcome: Progressing  1/12/2025 2150 by Anjali Grace RN  Outcome: Progressing     Problem: Respiratory - Adult  Goal: Achieves optimal ventilation and oxygenation  1/13/2025 0928 by Geneva Mcneal RN  Outcome: Progressing  1/13/2025 0842 by Geneva Mcneal RN  Outcome: Progressing

## 2025-01-13 NOTE — DISCHARGE INSTRUCTIONS
[facility]  [address] [place] [address]  [phone]      Breast Biopsy Discharge Instructions      1. After the biopsy, we will place a clean covered ice pack over the biopsy site, within the bra - you should leave the ice pack on 30 minutes and then remove the ice pack for 1-2 hours until bedtime.  If needed you can continue applying ice the following day. It is a good idea to wear your bra for support, both day and night unless this causes you discomfort.     2. You may take Extra-Strength Tylenol (two tablets) every 4 to 6 hours as needed for pain.  Do not take aspirin or aspirin products (e.g. ibuprofen, Advil, Motrin) as these may cause more bleeding.     3. You may expect some bruising and skin discoloration in the biopsy area.  This is normal and generally should resolve in 5 to 7 days.     4. Most women do not find it necessary to restrict their activities after the procedure. You should rest as needed on the day of your biopsy.  The next day, if you are feeling okay, you may resume your regular work/activity schedule.  Avoid strenuous activity and heavy lifting, jogging, aerobics, or vacuuming for 48 hours after the procedure.     5. 48 hours after your biopsy, remove the large outer dressing and leave the steri-strips (tiny pieces of tape) in place.  The steri-strips will usually fall off in a few days.  You may shower 48 hours after your biopsy and you may get the steri-strips wet.  If still present after 4 days, you may gently peel the strips off.  Keep the area clean and dry and shower daily.     6. If you have bleeding from the incision area, hold firm pressure on the area for 20 minutes.  This should control any slight oozing that might occur.  If you develop persistent bleeding or pain which does not respond to the above measures or if you develop a fever, excessive swelling, redness, heat or drainage, please call the Stereotactic Breast Health Navigator at

## 2025-01-14 VITALS
WEIGHT: 302.7 LBS | SYSTOLIC BLOOD PRESSURE: 152 MMHG | DIASTOLIC BLOOD PRESSURE: 91 MMHG | HEIGHT: 72 IN | RESPIRATION RATE: 18 BRPM | OXYGEN SATURATION: 94 % | HEART RATE: 99 BPM | TEMPERATURE: 97.1 F | BODY MASS INDEX: 41 KG/M2

## 2025-01-14 LAB
ANION GAP SERPL CALC-SCNC: 8 MMOL/L (ref 2–12)
BASOPHILS # BLD: 0.04 K/UL (ref 0–0.1)
BASOPHILS NFR BLD: 0.3 % (ref 0–1)
BUN SERPL-MCNC: 38 MG/DL (ref 6–20)
BUN/CREAT SERPL: 22 (ref 12–20)
CALCIUM SERPL-MCNC: 8.8 MG/DL (ref 8.5–10.1)
CHLORIDE SERPL-SCNC: 98 MMOL/L (ref 97–108)
CO2 SERPL-SCNC: 27 MMOL/L (ref 21–32)
CREAT SERPL-MCNC: 1.74 MG/DL (ref 0.7–1.3)
DIFFERENTIAL METHOD BLD: ABNORMAL
EOSINOPHIL # BLD: 0.02 K/UL (ref 0–0.4)
EOSINOPHIL NFR BLD: 0.1 % (ref 0–7)
ERYTHROCYTE [DISTWIDTH] IN BLOOD BY AUTOMATED COUNT: 14.5 % (ref 11.5–14.5)
GLUCOSE BLD STRIP.AUTO-MCNC: 176 MG/DL (ref 65–117)
GLUCOSE BLD STRIP.AUTO-MCNC: 216 MG/DL (ref 65–117)
GLUCOSE BLD STRIP.AUTO-MCNC: 279 MG/DL (ref 65–117)
GLUCOSE SERPL-MCNC: 155 MG/DL (ref 65–100)
HCT VFR BLD AUTO: 39.9 % (ref 36.6–50.3)
HGB BLD-MCNC: 12.9 G/DL (ref 12.1–17)
IMM GRANULOCYTES # BLD AUTO: 0.2 K/UL (ref 0–0.04)
IMM GRANULOCYTES NFR BLD AUTO: 1.3 % (ref 0–0.5)
LYMPHOCYTES # BLD: 0.97 K/UL (ref 0.8–3.5)
LYMPHOCYTES NFR BLD: 6.1 % (ref 12–49)
MCH RBC QN AUTO: 29 PG (ref 26–34)
MCHC RBC AUTO-ENTMCNC: 32.3 G/DL (ref 30–36.5)
MCV RBC AUTO: 89.7 FL (ref 80–99)
MONOCYTES # BLD: 0.97 K/UL (ref 0–1)
MONOCYTES NFR BLD: 6.1 % (ref 5–13)
NEUTS SEG # BLD: 13.7 K/UL (ref 1.8–8)
NEUTS SEG NFR BLD: 86.1 % (ref 32–75)
NRBC # BLD: 0 K/UL (ref 0–0.01)
NRBC BLD-RTO: 0 PER 100 WBC
PLATELET # BLD AUTO: 422 K/UL (ref 150–400)
PMV BLD AUTO: 8.8 FL (ref 8.9–12.9)
POTASSIUM SERPL-SCNC: 4.2 MMOL/L (ref 3.5–5.1)
RBC # BLD AUTO: 4.45 M/UL (ref 4.1–5.7)
SERVICE CMNT-IMP: ABNORMAL
SODIUM SERPL-SCNC: 133 MMOL/L (ref 136–145)
WBC # BLD AUTO: 15.9 K/UL (ref 4.1–11.1)

## 2025-01-14 PROCEDURE — 6370000000 HC RX 637 (ALT 250 FOR IP): Performed by: INTERNAL MEDICINE

## 2025-01-14 PROCEDURE — 82962 GLUCOSE BLOOD TEST: CPT

## 2025-01-14 PROCEDURE — 85025 COMPLETE CBC W/AUTO DIFF WBC: CPT

## 2025-01-14 PROCEDURE — 80048 BASIC METABOLIC PNL TOTAL CA: CPT

## 2025-01-14 PROCEDURE — 6370000000 HC RX 637 (ALT 250 FOR IP): Performed by: NURSE PRACTITIONER

## 2025-01-14 PROCEDURE — 2500000003 HC RX 250 WO HCPCS: Performed by: INTERNAL MEDICINE

## 2025-01-14 PROCEDURE — 99222 1ST HOSP IP/OBS MODERATE 55: CPT | Performed by: NURSE PRACTITIONER

## 2025-01-14 PROCEDURE — 6370000000 HC RX 637 (ALT 250 FOR IP): Performed by: HOSPITALIST

## 2025-01-14 PROCEDURE — 36415 COLL VENOUS BLD VENIPUNCTURE: CPT

## 2025-01-14 PROCEDURE — 94640 AIRWAY INHALATION TREATMENT: CPT

## 2025-01-14 PROCEDURE — 6360000002 HC RX W HCPCS: Performed by: INTERNAL MEDICINE

## 2025-01-14 PROCEDURE — 94660 CPAP INITIATION&MGMT: CPT

## 2025-01-14 PROCEDURE — 99222 1ST HOSP IP/OBS MODERATE 55: CPT

## 2025-01-14 RX ORDER — PEN NEEDLE, DIABETIC 31 GX5/16"
1 NEEDLE, DISPOSABLE MISCELLANEOUS DAILY
Qty: 100 EACH | Refills: 3 | Status: SHIPPED | OUTPATIENT
Start: 2025-01-14 | End: 2025-04-14

## 2025-01-14 RX ORDER — PREDNISONE 20 MG/1
20 TABLET ORAL DAILY
Status: DISCONTINUED | OUTPATIENT
Start: 2025-01-15 | End: 2025-01-14 | Stop reason: HOSPADM

## 2025-01-14 RX ORDER — BLOOD-GLUCOSE METER
1 KIT MISCELLANEOUS DAILY
Qty: 1 KIT | Refills: 0 | Status: SHIPPED | OUTPATIENT
Start: 2025-01-14

## 2025-01-14 RX ORDER — IPRATROPIUM BROMIDE AND ALBUTEROL SULFATE 2.5; .5 MG/3ML; MG/3ML
3 SOLUTION RESPIRATORY (INHALATION) EVERY 4 HOURS PRN
Qty: 360 ML | Refills: 1 | Status: SHIPPED | OUTPATIENT
Start: 2025-01-14 | End: 2025-02-13

## 2025-01-14 RX ORDER — POLYETHYLENE GLYCOL 3350 17 G/17G
17 POWDER, FOR SOLUTION ORAL DAILY PRN
Qty: 30 PACKET | Refills: 0 | Status: SHIPPED | OUTPATIENT
Start: 2025-01-14 | End: 2025-02-13

## 2025-01-14 RX ORDER — PREDNISONE 20 MG/1
20 TABLET ORAL DAILY
Qty: 10 TABLET | Refills: 0 | Status: SHIPPED | OUTPATIENT
Start: 2025-01-15 | End: 2025-01-25

## 2025-01-14 RX ORDER — INSULIN GLARGINE 100 [IU]/ML
26 INJECTION, SOLUTION SUBCUTANEOUS DAILY
Qty: 5 ADJUSTABLE DOSE PRE-FILLED PEN SYRINGE | Refills: 3 | Status: SHIPPED | OUTPATIENT
Start: 2025-01-14

## 2025-01-14 RX ORDER — DOXYCYCLINE HYCLATE 100 MG
100 TABLET ORAL EVERY 12 HOURS SCHEDULED
Qty: 10 TABLET | Refills: 0 | Status: SHIPPED | OUTPATIENT
Start: 2025-01-14 | End: 2025-01-19

## 2025-01-14 RX ORDER — OXYCODONE HYDROCHLORIDE 5 MG/1
7.5 TABLET ORAL EVERY 4 HOURS PRN
Qty: 126 TABLET | Refills: 0 | Status: SHIPPED | OUTPATIENT
Start: 2025-01-14 | End: 2025-01-28

## 2025-01-14 RX ORDER — OXYCODONE HYDROCHLORIDE 5 MG/1
5 TABLET ORAL EVERY 6 HOURS PRN
Qty: 20 TABLET | Refills: 0 | Status: SHIPPED | OUTPATIENT
Start: 2025-01-14 | End: 2025-01-14 | Stop reason: HOSPADM

## 2025-01-14 RX ORDER — GUAIFENESIN 600 MG/1
600 TABLET, EXTENDED RELEASE ORAL 2 TIMES DAILY
Qty: 14 TABLET | Refills: 0 | Status: SHIPPED | OUTPATIENT
Start: 2025-01-14 | End: 2025-01-21

## 2025-01-14 RX ORDER — ACETAMINOPHEN 500 MG
1000 TABLET ORAL 3 TIMES DAILY
Status: DISCONTINUED | OUTPATIENT
Start: 2025-01-14 | End: 2025-01-14 | Stop reason: HOSPADM

## 2025-01-14 RX ORDER — LANCETS 30 GAUGE
1 EACH MISCELLANEOUS DAILY
Qty: 100 EACH | Refills: 5 | Status: SHIPPED | OUTPATIENT
Start: 2025-01-14

## 2025-01-14 RX ORDER — GLUCOSAMINE HCL/CHONDROITIN SU 500-400 MG
1 CAPSULE ORAL DAILY
Qty: 90 STRIP | Refills: 0 | Status: SHIPPED | OUTPATIENT
Start: 2025-01-14 | End: 2025-04-14

## 2025-01-14 RX ORDER — OXYCODONE HYDROCHLORIDE 5 MG/1
7.5 TABLET ORAL EVERY 4 HOURS PRN
Status: DISCONTINUED | OUTPATIENT
Start: 2025-01-14 | End: 2025-01-14 | Stop reason: HOSPADM

## 2025-01-14 RX ADMIN — INSULIN LISPRO 4 UNITS: 100 INJECTION, SOLUTION INTRAVENOUS; SUBCUTANEOUS at 09:09

## 2025-01-14 RX ADMIN — SPIRONOLACTONE 25 MG: 25 TABLET ORAL at 09:10

## 2025-01-14 RX ADMIN — PREDNISONE 20 MG: 20 TABLET ORAL at 09:10

## 2025-01-14 RX ADMIN — INSULIN LISPRO 2 UNITS: 100 INJECTION, SOLUTION INTRAVENOUS; SUBCUTANEOUS at 17:19

## 2025-01-14 RX ADMIN — LISINOPRIL 10 MG: 10 TABLET ORAL at 09:11

## 2025-01-14 RX ADMIN — GUAIFENESIN 600 MG: 600 TABLET ORAL at 09:10

## 2025-01-14 RX ADMIN — DOXYCYCLINE HYCLATE 100 MG: 100 TABLET, COATED ORAL at 09:12

## 2025-01-14 RX ADMIN — IPRATROPIUM BROMIDE AND ALBUTEROL SULFATE 1 DOSE: .5; 3 SOLUTION RESPIRATORY (INHALATION) at 14:43

## 2025-01-14 RX ADMIN — ACETAMINOPHEN 1000 MG: 500 TABLET, FILM COATED ORAL at 14:39

## 2025-01-14 RX ADMIN — OXYCODONE HYDROCHLORIDE AND ACETAMINOPHEN 1 TABLET: 5; 325 TABLET ORAL at 09:11

## 2025-01-14 RX ADMIN — INSULIN GLARGINE 34 UNITS: 100 INJECTION, SOLUTION SUBCUTANEOUS at 09:08

## 2025-01-14 RX ADMIN — FUROSEMIDE 20 MG: 10 INJECTION, SOLUTION INTRAMUSCULAR; INTRAVENOUS at 09:09

## 2025-01-14 RX ADMIN — WATER 1000 MG: 1 INJECTION INTRAMUSCULAR; INTRAVENOUS; SUBCUTANEOUS at 17:19

## 2025-01-14 RX ADMIN — OXYCODONE 7.5 MG: 5 TABLET ORAL at 14:39

## 2025-01-14 RX ADMIN — OXYCODONE HYDROCHLORIDE AND ACETAMINOPHEN 1 TABLET: 5; 325 TABLET ORAL at 03:34

## 2025-01-14 RX ADMIN — SODIUM CHLORIDE, PRESERVATIVE FREE 10 ML: 5 INJECTION INTRAVENOUS at 09:13

## 2025-01-14 ASSESSMENT — PAIN DESCRIPTION - LOCATION
LOCATION: CHEST
LOCATION: BACK
LOCATION: BACK

## 2025-01-14 ASSESSMENT — PAIN DESCRIPTION - DESCRIPTORS
DESCRIPTORS: ACHING
DESCRIPTORS: ACHING

## 2025-01-14 ASSESSMENT — PAIN SCALES - GENERAL
PAINLEVEL_OUTOF10: 6
PAINLEVEL_OUTOF10: 5
PAINLEVEL_OUTOF10: 6
PAINLEVEL_OUTOF10: 3
PAINLEVEL_OUTOF10: 0

## 2025-01-14 ASSESSMENT — PAIN DESCRIPTION - ORIENTATION
ORIENTATION: RIGHT
ORIENTATION: LOWER
ORIENTATION: LOWER

## 2025-01-14 NOTE — DISCHARGE SUMMARY
31G X 8 MM Misc  blood glucose test strips  glucose monitoring kit  Lancets [facility]  Lantus SoloStar 100 UNIT/ML injection pen       These medications were sent to [place], [place] - [address] - P [phone] - F [phone]  [address], [facility] [address]      Phone: [phone]   doxycycline hyclate 100 MG tablet  guaiFENesin 600 MG extended release tablet  ipratropium 0.5 mg-albuterol 2.5 mg 0.5-2.5 (3) MG/3ML Soln nebulizer solution  oxyCODONE HCl 7.5 MG Tabs  predniSONE 20 MG tablet           Follow up Care:    Follow-up Information       Follow up With Specialties Details Why Contact Info    Bhavna Swenson APRN - NP Nurse Practitioner Schedule an appointment as soon as possible for a visit in 1 week(s)  [address] [place] [address] [phone]      Bhavna Swenson APRN - NP Nurse Practitioner   [address] [place] [address]  [phone]                   Diet:  cardiac diet    Disposition:  Home.    Advanced Directive:   FULL    DNR      Discharge Exam:                     Vitals:    01/14/25 1116   BP: 135/88   Pulse: (!) 103   Resp: 16   Temp: 97.5 °F (36.4 °C)   SpO2: 95%      General:  Alert, cooperative, no distress, appears stated age.   Lungs:   Clear to auscultation bilaterally.   Chest wall:  No tenderness or deformity.   Heart:  Regular rate and rhythm, S1, S2 normal, no murmur, click, rub or gallop.   Abdomen:   Soft, non-tender. Bowel sounds normal. No masses,  No organomegaly.   Extremities: Extremities normal, atraumatic, no cyanosis or edema.   Pulses: 2+ and symmetric all extremities.   Skin: Skin color, texture, turgor normal. No rashes or lesions   Neurologic: CNII-XII intact. No gross sensory or motor deficits             Significant Diagnostic Studies:   1/11/2025: BUN 27 MG/DL (H; Ref range: 6 - 20 MG/DL); Calcium 8.7 MG/DL (Ref range: 8.5 - 10.1 MG/DL); Chloride 99

## 2025-01-14 NOTE — CARE COORDINATION
01/14/25 1400   Discharge Planning   Type of Residence House   Patient expects to be discharged to: House   Services At/After Discharge   Transition of Care Consult (CM Consult) N/A   Services At/After Discharge None    Resource Information Provided? No   Mode of Transport at Discharge Other (see comment)   Confirm Follow Up Transport Family   Condition of Participation: Discharge Planning   The Plan for Transition of Care is related to the following treatment goals: PCP and specialist   The Patient and/or Patient Representative was provided with a Choice of Provider? Patient   The Patient and/Or Patient Representative agree with the Discharge Plan? Yes   Freedom of Choice list was provided with basic dialogue that supports the patient's individualized plan of care/goals, treatment preferences, and shares the quality data associated with the providers?  Yes     Discharge order is in. Patient will get most of his meds from [facility] pharmacy to be delivered at 2 pm. He will  insulin supplies and insulin at regular pharmacy. WARREN has ordered a nebulizer for him and expect this to be approved and delivered before his sister picks him up at 5 pm. He has a BIPAP at  home.  English Lulu CHAVIS    [phone]

## 2025-01-14 NOTE — CONSULTS
[facility] [place]  at [facility]  [address], [address]  [place] [address] [phone]        Brief Oncology Consult Note      59/M with what seems like metastatic cancer. Biopsy has been ordered. I received the consult and will see the patient. Note follow.       Signed by: David Landa MD                     January 12, 2025    
  Pulmonary, Critical Care, and Sleep Medicine    Initial Patient Consult    Name: Francis Sierra MRN: [id number]   : 1965 Hospital: [facility]   Date: 2025        IMPRESSION:   Acute hypoxic respiratory failure  Post-obstructive pneumonia  Right breast mass s/p biopsy   Right hilar mass (likely metastatic disease) with station 5 contralateral adenopathy  Multiple bony metastases  Cor pulmonale with pulmonary HTN (likely group 2/3)  GILDA on home BiPAP 20/10, PS 6  CKD  Hyponatremia   Former smoker      RECOMMENDATIONS:   O2, wean as tolerated  Empiric antibiotics for post-obstructive pneumonia, on ceftriaxone, doxy  Oncology following   Follow cytology for breast biopsy   Bone biopsy ordered but cancelled by IR   Home BiPAP  DVT prophylaxis  Discussed with patient at length regarding his likely malignant diagnosis.     Will sign off. Will arrange outpatient follow up with our office for pathology results. If no evidence of lung cancer on breast biopsy would need outpatient bronch. Please call with questions.      Subjective:      Patient seen this afternoon after biopsy. States his breathing is good. Endorses dyspnea with exertion. Satting well on 2L NC.     Reason for consult: hilar mass, GILDA  Patient is a 59 y.o. male former smoker, presented to the hospital with worsening shortness of breath over the course of several days.  He was last seen in 2022 by [facility] for shortness of breath, but he was lost to follow up.  He was seen by [facility] shortly before that and started on BiPAP and was again lost to follow up. At this time, however, he has noted worsening dyspnea over several days, and unfortunately, in the ER was noted to have a large right breast mass, right hilar mass with contralateral (station 5) adenopathy, as well as multiple bony metastases. He denies any hemoptysis, f/c/ns/wt loss.  He noted the breast mass about 2 months 
  Pulmonary, Critical Care, and Sleep Medicine    Initial Patient Consult    Name: Francis Sierra MRN: [id number]   : 1965 Hospital: [facility]   Date: 2025        IMPRESSION:   Acute hypoxic respiratory failure  Post-obstructive pneumonia  Right breast mass  Right hilar mass (likely metastatic disease) with station 5 contralateral adenopathy  Multiple bony metastases  Cor pulmonale with pulmonary HTN (likely group 2/3)  GILDA on home BiPAP 20/10, PS 6  CKD  Hyponatremia   Former smoker      RECOMMENDATIONS:   O2, wean as tolerated  Empiric antibiotics for post-obstructive pneumonia, will need to add anaerobic coverage to his current regimen  Agree with plans for biopsy of breast and/or bone by IR to assess for metastatic disease  Home BiPAP  DVT prophylaxis  Discussed with patient at length regarding his likely malignant diagnosis.      Subjective:     Reason for consult: hilar mass, GILDA  Patient is a 59 y.o. male former smoker, presented to the hospital with worsening shortness of breath over the course of several days.  He was last seen in 2022 by [facility] for shortness of breath, but he was lost to follow up.  He was seen by [facility] shortly before that and started on BiPAP and was again lost to follow up. At this time, however, he has noted worsening dyspnea over several days, and unfortunately, in the ER was noted to have a large right breast mass, right hilar mass with contralateral (station 5) adenopathy, as well as multiple bony metastases. He denies any hemoptysis, f/c/ns/wt loss.  He noted the breast mass about 2 months ago.       Past Medical History:   Diagnosis Date    Alcohol abuse     Former smoker     Hypercholesterolemia     Hypertension     GILDA (obstructive sleep apnea)     Prediabetes     Pulmonary hypertension (HCC)     Right heart failure with reduced right ventricular function (HCC)       No past surgical history on file. 
Palliative Medicine  Patient Name: Francis Sierra  YOB: 1965  MRN: [id number]  Age: 59 y.o.  Gender: male    Due to high consult volume- this patient will be addressed in the next 24-48 hours    NYASIA Sanchez NP    
137.3 kg (302 lb 11.2 oz)   09/30/24 (!) 141.5 kg (312 lb)   06/24/24 (!) 142.9 kg (315 lb)   01/08/24 (!) 144.2 kg (318 lb)   08/21/23 135.7 kg (299 lb 3.2 oz)   06/21/23 133.8 kg (295 lb)   06/16/23 133.9 kg (295 lb 3.2 oz)   03/17/23 129.3 kg (285 lb)   01/18/23 (!) 137.4 kg (303 lb)   11/07/22 134.7 kg (297 lb)   08/29/22 134.3 kg (296 lb)   08/18/22 133.5 kg (294 lb 6.4 oz)   05/17/22 131.1 kg (289 lb)   04/11/22 131.1 kg (289 lb)   03/23/22 133.6 kg (294 lb 8 oz)        Current Diet: ADULT DIET; Regular; 4 carb choices (60 gm/meal)       PSYCHOSOCIAL/SPIRITUAL SCREENING:   Palliative IDT has assessed this patient for cultural preferences / practices and a referral made as appropriate to needs (Cultural Services, Patient Advocacy, Ethics, etc.)    Spiritual Affiliation: Gnosticist    Any spiritual / Restoration concerns:  [] Yes /  [x] No   If \"Yes\" to discuss with pastoral care during IDT     Does caregiver feel burdened by caring for their loved one:   [] Yes /  [x] No /  [] No Caregiver Present/Available [] No Caregiver [] Pt Lives at Facility  If \"Yes\" to discuss with social work during IDT    Anticipatory grief assessment:   [x] Normal  / [] Maladaptive     If \"Maladaptive\" to discuss with social work during IDT    ESAS Anxiety: Anxiety Score: Not anxious    ESAS Depression: Depression Score: Not depressed        LAB AND IMAGING FINDINGS:   Objective data reviewed:  labs, images, records, medication use, vitals, and chart     FINAL COMMENTS   Thank you for allowing Palliative Medicine to participate in the care of Francis Finnt Rigo.    Only check if applicable and billing time based rather than MDM  [] The total encounter time on this service date was ____ minutes which was spent performing a face-to-face encounter and personally completing the provider-level activities documented in the note. This includes time spent prior to the visit and after the visit in direct care of the patient. This time does

## 2025-01-14 NOTE — DIABETES MGMT
[facility]  [facility] FOR DIABETES HEALTH  DIABETES MANAGEMENT CONSULT    Consulted by Provider for advanced nursing evaluation and care for inpatient blood glucose management.    Evaluation and Action Plan   Francis Sierra is a 59 year old male patient with new onset diabetes. The current A1c is 6.9%. The patient was admitted after experiencing increased SOB. The patient CT scan is noted to be concerning for malignancy. The patient was started on steroids while hospitalized, which I suspect was the primary culprit for the increased BG's. The patient will be discharged on 20 mg prednisone. His Bg's are still elevate and he has required insulin. I recommend continuing insulin at discharge. Survival skills discussed. Handouts provided.  Insulin pen administration demonstrated with fake skin model. The patient would likely benefit from outpatient diabetes education.     Blood glucose pattern            Diabetes Discharge Plan   Medication  Use 26 units lantus daily  Monitor Bg's  Follow-up with outpatient provider for future diabetes management   Referral  [x]        Outpatient diabetes education   Additional orders            Initial Presentation   Francis Sierra is a 59 y.o. male who presented to the ED on 1/14/25  Narrative & Impression  INDICATION: SOB       COMPARISON: None     FINDINGS:     AP portable view of the chest demonstrates normal heart size. Prominent thoracic  aorta. Right hilar/perihilar masslike density. The osseous structures are  unremarkable.     IMPRESSION:  Right hilar/perihilar masslike density and prominent cardiomediastinal  silhouette. Recommend CT chest with IV contrast.       HX:   Past Medical History:   Diagnosis Date    Alcohol abuse     Former smoker     Hypercholesterolemia     Hypertension     GILDA (obstructive sleep apnea)     Prediabetes     Pulmonary hypertension (HCC)     Right heart failure with reduced right ventricular function (HCC)         INITIAL DX: Acute

## 2025-01-14 NOTE — PLAN OF CARE
Problem: Discharge Planning  Goal: Discharge to home or other facility with appropriate resources  1/13/2025 0928 by Geneva Mcneal RN  Outcome: Progressing  1/13/2025 0842 by Geneva Mcneal RN  Outcome: Progressing     Problem: Pain  Goal: Verbalizes/displays adequate comfort level or baseline comfort level  Recent Flowsheet Documentation  Taken 1/13/2025 1545 by Richy Carbone RN  Verbalizes/displays adequate comfort level or baseline comfort level:   Encourage patient to monitor pain and request assistance   Assess pain using appropriate pain scale   Implement non-pharmacological measures as appropriate and evaluate response   Administer analgesics based on type and severity of pain and evaluate response   Consider cultural and social influences on pain and pain management   Notify Licensed Independent Practitioner if interventions unsuccessful or patient reports new pain  1/13/2025 0928 by Geneva Mcneal RN  Outcome: Progressing  1/13/2025 0842 by Geneva Mcneal RN  Outcome: Progressing  Flowsheets (Taken 1/13/2025 0842)  Verbalizes/displays adequate comfort level or baseline comfort level:   Notify Licensed Independent Practitioner if interventions unsuccessful or patient reports new pain   Administer analgesics based on type and severity of pain and evaluate response   Encourage patient to monitor pain and request assistance     Problem: ABCDS Injury Assessment  Goal: Absence of physical injury  1/13/2025 0928 by Geneva Mcneal RN  Outcome: Progressing  1/13/2025 0842 by Geneva Mcneal RN  Outcome: Progressing     Problem: Safety - Adult  Goal: Free from fall injury  1/13/2025 1956 by Anjali Grace RN  Outcome: Progressing  1/13/2025 0928 by Geneva Mcneal RN  Outcome: Progressing  1/13/2025 0842 by Geneva Mcneal RN  Outcome: Progressing     Problem: Respiratory - Adult  Goal: Achieves optimal ventilation and oxygenation  1/13/2025 0928 by Geneva Mcneal

## 2025-01-14 NOTE — PLAN OF CARE
Problem: Discharge Planning  Goal: Discharge to home or other facility with appropriate resources  1/14/2025 1306 by Richy Carbone RN  Outcome: Adequate for Discharge  1/14/2025 1058 by Richy Carbone RN  Outcome: Progressing  Flowsheets (Taken 1/14/2025 0800)  Discharge to home or other facility with appropriate resources:   Identify barriers to discharge with patient and caregiver   Arrange for needed discharge resources and transportation as appropriate   Identify discharge learning needs (meds, wound care, etc)   Arrange for interpreters to assist at discharge as needed     Problem: Pain  Goal: Verbalizes/displays adequate comfort level or baseline comfort level  1/14/2025 1306 by Richy Carbone RN  Outcome: Adequate for Discharge  1/14/2025 1058 by Richy Carbone RN  Outcome: Progressing  Flowsheets (Taken 1/14/2025 0845)  Verbalizes/displays adequate comfort level or baseline comfort level:   Encourage patient to monitor pain and request assistance   Assess pain using appropriate pain scale   Implement non-pharmacological measures as appropriate and evaluate response   Administer analgesics based on type and severity of pain and evaluate response   Consider cultural and social influences on pain and pain management   Notify Licensed Independent Practitioner if interventions unsuccessful or patient reports new pain     Problem: Safety - Adult  Goal: Free from fall injury  1/14/2025 1306 by Richy Carbone RN  Outcome: Adequate for Discharge  1/14/2025 1058 by Richy Carbone RN  Outcome: Progressing     Problem: Respiratory - Adult  Goal: Achieves optimal ventilation and oxygenation  1/14/2025 1306 by Richy Carbone RN  Outcome: Adequate for Discharge  1/14/2025 1058 by Richy Carbone RN  Outcome: Progressing  Flowsheets (Taken 1/14/2025 0800)  Achieves optimal ventilation and oxygenation:   Assess for changes in respiratory status   Assess for changes in mentation and behavior   Position to facilitate oxygenation and

## 2025-01-14 NOTE — PLAN OF CARE
Problem: Discharge Planning  Goal: Discharge to home or other facility with appropriate resources  Outcome: Progressing  Flowsheets (Taken 1/14/2025 0800)  Discharge to home or other facility with appropriate resources:   Identify barriers to discharge with patient and caregiver   Arrange for needed discharge resources and transportation as appropriate   Identify discharge learning needs (meds, wound care, etc)   Arrange for interpreters to assist at discharge as needed     Problem: Pain  Goal: Verbalizes/displays adequate comfort level or baseline comfort level  Outcome: Progressing  Flowsheets (Taken 1/14/2025 0845)  Verbalizes/displays adequate comfort level or baseline comfort level:   Encourage patient to monitor pain and request assistance   Assess pain using appropriate pain scale   Implement non-pharmacological measures as appropriate and evaluate response   Administer analgesics based on type and severity of pain and evaluate response   Consider cultural and social influences on pain and pain management   Notify Licensed Independent Practitioner if interventions unsuccessful or patient reports new pain     Problem: Safety - Adult  Goal: Free from fall injury  Outcome: Progressing     Problem: Respiratory - Adult  Goal: Achieves optimal ventilation and oxygenation  Outcome: Progressing  Flowsheets (Taken 1/14/2025 0800)  Achieves optimal ventilation and oxygenation:   Assess for changes in respiratory status   Assess for changes in mentation and behavior   Position to facilitate oxygenation and minimize respiratory effort   Oxygen supplementation based on oxygen saturation or arterial blood gases

## 2025-01-14 NOTE — CARE COORDINATION
CM Note:DrRosie To write a prescription for the nebulizer machine and accessories.  English Lulu CHAVIS CM   [id number]   Yes

## 2025-01-14 NOTE — PROGRESS NOTES
[facility] [place]  at [facility]  [address]  [place] [address]  [phone]      Oncology Note        Patient: Francis Sierra MRN: [id number]  SSN: [id number]    YOB: 1965  Age: 59 y.o.  Sex: male      Subjective:      Francis Sierra is a 59 y.o. male who is a former smoker, presented to the hospital with worsening shortness of breath over the course of several days.  He was last seen in October 2022 by [facility] for [facility] breath, but he was lost to follow up.  He was seen by [facility] shortly before that and started on BiPAP and was again lost to follow up.     He came to the ED for worsening dyspnea over several days. He underwent a CT in the ER which showed right breast mass, right hilar mass with contralateral (station 5) adenopathy, as well as multiple bony metastases. He denies any hemoptysis, f/c/ns/wt loss.  He noted the breast mass about 2 months ago.     Interval History:   1/13/2025  At the bedside, discussed plans for biopsy later today.  Ideally would image abdominal cavity.  Await JUSTUS resolution to screen for metastatic disease.  Discussed outpatient follow-up to review biopsy results.    1/14/2005  Patient seen at bedside, reports he will be bringing his sister's to follow-up appointment to discuss biopsy results.  Discussed outpatient follow-up scheduled for Thursday of this week.  Patient has many questions regarding pain management and overall prognosis.  Unable to discuss prognosis and death as pathology has not yet resulted.  Discussed pain management with palliative care moving forward.  Patient verbalized understanding agreement.    Review of Systems:  Constitutional: positive for fatigue  Eyes: negative  Ears, Nose, Mouth, Throat, and Face: negative  Respiratory: positive for dyspnea on exertion, pleurisy/chest pain, and shortness of 
Accessed chart for future admission  
Chart reviewed for ordered procedure.   
End of Shift Note    Bedside shift change report given to Anjali CHAVIS (oncoming nurse) by Geneva Mcneal, PALMIRA (offgoing nurse).  Report included the following information SBAR, Kardex, MAR, and Recent Results    Shift worked:  7a-7p     Shift summary and any significant changes:     Breast bx done, bone bx cx, pain management.      Concerns for physician to address:  Pt wants to know the plan     Zone phone for oncoming shift:          Activity:     Number times ambulated in hallways past shift: 0  Number of times OOB to chair past shift: 0    Cardiac:   Cardiac Monitoring: Yes           Access:  Current line(s): PIV     Genitourinary:   Urinary status: voiding    Respiratory:      Chronic home O2 use?: NO  Incentive spirometer at bedside: NO       GI:     Current diet:  ADULT DIET; Regular; 4 carb choices (60 gm/meal)  Passing flatus: YES  Tolerating current diet: YES       Pain Management:   Patient states pain is manageable on current regimen: YES    Skin:     Interventions: increase time out of bed    Patient Safety:  Fall Score:    Interventions: bed/chair alarm       Length of Stay:  Expected LOS: 5  Actual LOS: 2      Geneva Mcneal, RN                             
Note made of consult placed to Dr. Hernández.  However, patient is an established patient of [facility].  Formal consult will follow this morning.   Timbo Russo MD   
Procedure reviewed with patient by Dr. Srini Tran. Opportunity to verbalize questions and concerns.  Consent obtained.   
Pt IV is out and was intact. Pt was taken to lobby where he was discharged home. Vitals are documented in flowsheet.   
Pulmonary:  Plans for discharge noted.   He is following up with oncology for biopsy results.   We will arrange for follow up in case additional bronchoscopic biopsies are required.   Timbo Russo MD   
01/11/2025    Narrative  EXAM:  CTA CHEST W WO CONTRAST    INDICATION: PE, shortness of breath    COMPARISON: Chest radiograph 1/11/2025    TECHNIQUE: Helical thin section chest CT following uneventful intravenous  administration of nonionic contrast 100 mL of isovue 370 according to  departmental PE protocol. Coronal and sagittal reformats were performed. 3D post  processing was performed.  CT dose reduction was achieved through the use of a  standardized protocol tailored for this examination and automatic exposure  control for dose modulation.    FINDINGS: This is a good quality study for the evaluation of pulmonary embolism  to the first subsegmental arterial level. There is no pulmonary embolism to this  level.    CHEST WALL: 4.8 cm right retroareolar mass, concerning for malignancy.  THYROID: No nodule.  MEDIASTINUM: 14 mm aortopulmonary lobe window lymph node.  SHERIDAN: 8.9 cm x 6.0 cm x 5.8 cm right hilar mass, compatible with malignancy.  THORACIC AORTA: Atherosclerosis. No aneurysm.  HEART: Cardiomegaly. Small pericardial effusion. Coronary artery calcifications.  ESOPHAGUS: No wall thickening or dilatation.  TRACHEA/BRONCHI: Patent.  PLEURA: No effusion or pneumothorax.  LUNGS: No nodule, mass, or airspace disease. Right lower lobe bandlike  atelectasis.  UPPER ABDOMEN: Partially imaged. 2.5 cm indeterminate left adrenal nodule..  BONES: T4 spinous process, T10 vertebral body, and T12 vertebral body lytic  lesions, concerning for metastases.    Impression  1.  No pulmonary embolism.  2.  Right breast mass, concerning for malignancy.  3.  Right hilar mass, concerning for malignancy.  4.  Enlarged AP window lymph node, concerning for metastasis.  5.  Scattered lytic osseous lesions, concerning for metastases.  6.  Left adrenal mass, cannot exclude metastasis.    Recommend nonemergent oncologic consultation and OUTPATIENT PET/CT for further  evaluation.    Numerous attempts to reach the ordering provider 
according to  departmental PE protocol. Coronal and sagittal reformats were performed. 3D post  processing was performed.  CT dose reduction was achieved through the use of a  standardized protocol tailored for this examination and automatic exposure  control for dose modulation.    FINDINGS: This is a good quality study for the evaluation of pulmonary embolism  to the first subsegmental arterial level. There is no pulmonary embolism to this  level.    CHEST WALL: 4.8 cm right retroareolar mass, concerning for malignancy.  THYROID: No nodule.  MEDIASTINUM: 14 mm aortopulmonary lobe window lymph node.  SHERIDAN: 8.9 cm x 6.0 cm x 5.8 cm right hilar mass, compatible with malignancy.  THORACIC AORTA: Atherosclerosis. No aneurysm.  HEART: Cardiomegaly. Small pericardial effusion. Coronary artery calcifications.  ESOPHAGUS: No wall thickening or dilatation.  TRACHEA/BRONCHI: Patent.  PLEURA: No effusion or pneumothorax.  LUNGS: No nodule, mass, or airspace disease. Right lower lobe bandlike  atelectasis.  UPPER ABDOMEN: Partially imaged. 2.5 cm indeterminate left adrenal nodule..  BONES: T4 spinous process, T10 vertebral body, and T12 vertebral body lytic  lesions, concerning for metastases.    Impression  1.  No pulmonary embolism.  2.  Right breast mass, concerning for malignancy.  3.  Right hilar mass, concerning for malignancy.  4.  Enlarged AP window lymph node, concerning for metastasis.  5.  Scattered lytic osseous lesions, concerning for metastases.  6.  Left adrenal mass, cannot exclude metastasis.    Recommend nonemergent oncologic consultation and OUTPATIENT PET/CT for further  evaluation.    Numerous attempts to reach the ordering provider through the ER  and  the direct provider phone line for unsuccessful. Attempts were made between 1650  and 1700 hours on 1/11/2025.    789      Were unsuccessful      Electronically signed by STEPHANIE ERVIN I personally reviewed the imaging. Right hilar mass, bony 
cardiomediastinal   silhouette. Recommend CT chest with IV contrast.      Electronically signed by Rigo Saavedra      CT BIOPSY DEEP BONE PERCUTANEOUS    (Results Pending)   US BREAST BIOPSY AXILLA RIGHT    (Results Pending)          Discussion/MDM:     [x] High (any 2)    A. Problems (any 1)  [x] Acute/Chronic Illness/injury posing threat to life or bodily function:    [] Severe exacerbation of chronic illness:    ---------------------------------------------------------------------  B. Risk of Treatment (any 1)   [] Drugs/treatments that require intensive monitoring for toxicity include:    [x] IV ABX requiring serial renal monitoring for nephrotoxicity:     [] IV Narcotic analgesia for adverse drug reaction  [] Aggressive IV diuresis requiring serial monitoring for renal impairment and electrolyte derangements  [x] Critical electrolyte abnormalities requiring IV replacement and close serial monitoring  [] SQ Insulin SS- monitoring serial FSBS for Hypoglycemic adverse drug reaction  [] Other -   [] Change in code status:    [] Decision to escalate care:    [] Major surgery/procedure with associated risk factors:    ----------------------------------------------------------------------  C. Data (any 2)  [x] Discussed current management and discharge planning options with Case Management.  [x] Discussed management of the case with:    [] Telemetry personally reviewed and interpreted as documented above    [] Imaging personally reviewed and interpreted, includes:    [] Data Review (any 3)  [x] All available Consultant notes from yesterday/today were reviewed  [x] All current labs were reviewed and interpreted for clinical significance   [] Appropriate follow-up labs were ordered  [] Collateral history obtained from:               Assessment:    Acute hypoxic respiratory failure  Post obstructive PNA  Right hilar mass  Right breast mass x 2 months  Metastatic cancer  GILDA on home BIPAP  Former smoker, quit 5 years 
months  Metastatic cancer  GILDA on home BIPAP  Former smoker, quit 5 years ago  -CTA of chest:   1.  No pulmonary embolism.  2.  Right breast mass, concerning for malignancy.  3.  Right hilar mass, concerning for malignancy.  4.  Enlarged AP window lymph node, concerning for metastasis.  5.  Scattered lytic osseous lesions, concerning for metastases.  6.  Left adrenal mass, cannot exclude metastasis.     -start empiric rocephin and doxycycline. Reviewed CT with Dr Hernádnez and there is evidence of air space disease, suspect post obstructive PNA  -scheduled IV steroids  -scheduled duonebs  -continue BIPAP at HS and prn during the day  -oxygen prn  -Consult oncology in AM  -Consult [place] [facility] - Discussed with Dr Hernández.  Will request IR on Monday to assess patient for biopsy of either his right breast mass or one of the osseous lesions     Chronic systolic heart failure  -Most recent echo 6/2023: EF 60-65%, no AS, no MR  -switch to IV lasix  -continue lisinopril and spironolactone     Hypertension  -hold amlodipine.  Follow BP  -IV hydralazine as needed     Hyponatremia  Hypokalemia  -Magnesium WNL  -Trend labs     JUSTUS versus CKD  -Last creatinine 1.32 back February 2024  -Trend lab  -check renal US     Prediabetes  -Follow FBS  -A1c in a.m.        Plan:    Follow up tissue biopsy once obtained      Code status:Full    Social determinants of health: none      Estimated discharge date//time frame/disposition:1/15    Barriers to discharge: Clinical improvement          Francis Lowe MD

## 2025-01-15 ENCOUNTER — CLINICAL DOCUMENTATION (OUTPATIENT)
Facility: CLINIC | Age: 60
End: 2025-01-15

## 2025-01-15 NOTE — PROGRESS NOTES
Care Transitions Initial Follow Up Call    Outreach made within 2 business days of discharge: Yes    Patient: Francis Sierra Patient : 1965   MRN: [id number]  Reason for Admission: Acute Hypoxemic Respiratory Failure  Discharge Date: 25       Spoke with: Patient    Discharge department/facility: [facility] Interactive Patient Contact:  Was patient able to fill all prescriptions: Yes  Was patient instructed to bring all medications to the follow-up visit: Yes  Is patient taking all medications as directed in the discharge summary? Yes  Does patient understand their discharge instructions: Yes  Does patient have questions or concerns that need addressed prior to 7-14 day follow up office visit: no    Additional needs identified to be addressed with provider  No needs identified             Scheduled appointment with PCP within 7-14 days    Follow Up  Future Appointments   Date Time Provider Department Center   2025 10:00 AM David Landa MD [facility] BS AMB   2025  1:30 PM [facility]   3/3/2025  3:00 PM [facility]       ROBERTO DOVER LPN

## 2025-01-16 ENCOUNTER — HOSPITAL ENCOUNTER (OUTPATIENT)
Facility: HOSPITAL | Age: 60
Setting detail: INFUSION SERIES
Discharge: HOME | End: 2025-01-16

## 2025-01-16 ENCOUNTER — OFFICE VISIT (OUTPATIENT)
Age: 60
End: 2025-01-16
Payer: COMMERCIAL

## 2025-01-16 ENCOUNTER — OFFICE VISIT (OUTPATIENT)
Facility: CLINIC | Age: 60
End: 2025-01-16
Payer: COMMERCIAL

## 2025-01-16 ENCOUNTER — CLINICAL DOCUMENTATION (OUTPATIENT)
Age: 60
End: 2025-01-16

## 2025-01-16 ENCOUNTER — TELEPHONE (OUTPATIENT)
Age: 60
End: 2025-01-16

## 2025-01-16 VITALS
HEART RATE: 116 BPM | WEIGHT: 302 LBS | DIASTOLIC BLOOD PRESSURE: 83 MMHG | OXYGEN SATURATION: 93 % | SYSTOLIC BLOOD PRESSURE: 121 MMHG | TEMPERATURE: 97.9 F | BODY MASS INDEX: 40.9 KG/M2 | HEIGHT: 72 IN

## 2025-01-16 VITALS
RESPIRATION RATE: 18 BRPM | HEIGHT: 72 IN | HEART RATE: 120 BPM | WEIGHT: 301.1 LBS | SYSTOLIC BLOOD PRESSURE: 105 MMHG | DIASTOLIC BLOOD PRESSURE: 83 MMHG | BODY MASS INDEX: 40.78 KG/M2

## 2025-01-16 DIAGNOSIS — R06.09 DYSPNEA ON EXERTION: ICD-10-CM

## 2025-01-16 DIAGNOSIS — I50.22 CHRONIC SYSTOLIC (CONGESTIVE) HEART FAILURE (HCC): Primary | ICD-10-CM

## 2025-01-16 DIAGNOSIS — R91.8 MASS OF RIGHT LUNG: ICD-10-CM

## 2025-01-16 DIAGNOSIS — N63.10 MASS OF RIGHT BREAST, UNSPECIFIED QUADRANT: Primary | ICD-10-CM

## 2025-01-16 DIAGNOSIS — G47.33 OBSTRUCTIVE SLEEP APNEA SYNDROME: ICD-10-CM

## 2025-01-16 DIAGNOSIS — C50.919 MALIGNANT NEOPLASM OF BREAST METASTATIC TO BONE (HCC): Primary | ICD-10-CM

## 2025-01-16 DIAGNOSIS — C79.51 MALIGNANT NEOPLASM OF BREAST METASTATIC TO BONE (HCC): Primary | ICD-10-CM

## 2025-01-16 DIAGNOSIS — I10 ESSENTIAL HYPERTENSION: ICD-10-CM

## 2025-01-16 DIAGNOSIS — E11.9 DIABETES MELLITUS, NEW ONSET (HCC): ICD-10-CM

## 2025-01-16 DIAGNOSIS — C79.51 METASTATIC CANCER TO BONE (HCC): ICD-10-CM

## 2025-01-16 PROCEDURE — 3044F HG A1C LEVEL LT 7.0%: CPT | Performed by: NURSE PRACTITIONER

## 2025-01-16 PROCEDURE — 99215 OFFICE O/P EST HI 40 MIN: CPT | Performed by: INTERNAL MEDICINE

## 2025-01-16 PROCEDURE — 3079F DIAST BP 80-89 MM HG: CPT | Performed by: NURSE PRACTITIONER

## 2025-01-16 PROCEDURE — 3074F SYST BP LT 130 MM HG: CPT | Performed by: NURSE PRACTITIONER

## 2025-01-16 PROCEDURE — 99214 OFFICE O/P EST MOD 30 MIN: CPT | Performed by: NURSE PRACTITIONER

## 2025-01-16 ASSESSMENT — PATIENT HEALTH QUESTIONNAIRE - PHQ9
9. THOUGHTS THAT YOU WOULD BE BETTER OFF DEAD, OR OF HURTING YOURSELF: NOT AT ALL
7. TROUBLE CONCENTRATING ON THINGS, SUCH AS READING THE NEWSPAPER OR WATCHING TELEVISION: NOT AT ALL
SUM OF ALL RESPONSES TO PHQ QUESTIONS 1-9: 0
SUM OF ALL RESPONSES TO PHQ9 QUESTIONS 1 & 2: 0
10. IF YOU CHECKED OFF ANY PROBLEMS, HOW DIFFICULT HAVE THESE PROBLEMS MADE IT FOR YOU TO DO YOUR WORK, TAKE CARE OF THINGS AT HOME, OR GET ALONG WITH OTHER PEOPLE: NOT DIFFICULT AT ALL
5. POOR APPETITE OR OVEREATING: NOT AT ALL
3. TROUBLE FALLING OR STAYING ASLEEP: NOT AT ALL
SUM OF ALL RESPONSES TO PHQ QUESTIONS 1-9: 0
SUM OF ALL RESPONSES TO PHQ QUESTIONS 1-9: 0
4. FEELING TIRED OR HAVING LITTLE ENERGY: NOT AT ALL
8. MOVING OR SPEAKING SO SLOWLY THAT OTHER PEOPLE COULD HAVE NOTICED. OR THE OPPOSITE, BEING SO FIGETY OR RESTLESS THAT YOU HAVE BEEN MOVING AROUND A LOT MORE THAN USUAL: NOT AT ALL
6. FEELING BAD ABOUT YOURSELF - OR THAT YOU ARE A FAILURE OR HAVE LET YOURSELF OR YOUR FAMILY DOWN: NOT AT ALL
1. LITTLE INTEREST OR PLEASURE IN DOING THINGS: NOT AT ALL
2. FEELING DOWN, DEPRESSED OR HOPELESS: NOT AT ALL
SUM OF ALL RESPONSES TO PHQ QUESTIONS 1-9: 0

## 2025-01-16 NOTE — PROGRESS NOTES
Francis Sierra is a 59 y.o. male here for new patient appt for met carcinoma.  Hospital follow up per Wisam Lemon  Pt here with sister.   Deals with intermittent chest and back pain. Level 4 today. Was 0 all day yesterday. Day before was a bad day.     1. Have you been to the ER, urgent care clinic since your last visit?  Hospitalized since your last visit?  New Pt    2. Have you seen or consulted any other health care providers outside of the [facility] since your last visit?  Include any pap smears or colon screening.  New Pt  
made between 1650  and 1700 hours on 1/11/2025.    789      Were unsuccessful      Electronically signed by STEPHANIE ERVIN    I personally reviewed the imaging. Right hilar mass, bony metastasis, right breast mass and left adrenal mass          Assessment:     1. Invasive breast carcinoma  Metastatic cancer with right hilar mass, bony metastasis, right breast mass and left adrenal mass  ER -ve UT -ve Her 2 0    > Unresectable disease     ECOG PS 1    I spent 65 minute with the patient in a face-to-face encounter. I explained him the stage of the disease, pathophysiology of the disease and the treatment approaches. I answered all his questions. More than 50% of the time was utilized in education, counseling and co-ordination of care.     I will obtain a Bx of the right hilar mass to confirm that the disease in the lung is breast ca. Depending on that information, we will decide on bone Bx or no further biopsies.     Since the breast is Triple negative, PD-L1 testing has been ordered. I am also obtaining an NGS and GUARDANT 360 in blood.     If the disease is all TNBC, I would recommend systemic therapy. Inclusion of PD-1 agent will depend on PD-L1 test results.       Plan:       Brain MRI, CT abdomen/Pelvis  Bronchoscopy, EBUS Bx  Germline testing  GUARDANT 360 liq  CARIS NGS on tissue  PD-L1 testing on tissue  Return in 2 weeks.  Palliative care follow up      Signed By: David Landa MD     January 16, 2025

## 2025-01-16 NOTE — PROGRESS NOTES
\"Have you been to the ER, urgent care clinic since your last visit?  Hospitalized since your last visit?\"    NO    “Have you seen or consulted any other health care providers outside our system since your last visit?”    NO           Chief Complaint   Patient presents with    Follow-Up from Hospital     Pt states went to [facility] for shortness of breathe and  came home 1/14/25     /83 (Site: Right Upper Arm, Position: Sitting, Cuff Size: Medium Adult) Comment: at rest  Pulse (!) 120   Resp 18   Ht 1.829 m (6')   Wt (!) 136.6 kg (301 lb 1.6 oz)   BMI 40.84 kg/m²

## 2025-01-16 NOTE — TELEPHONE ENCOUNTER
[facility] Palliative Medicine Office  Nursing Note  [phone]  Fax [phone]      Name:  Francis Sierra  YOB: 1965    Received outpatient Palliative Medicine referral from Radha Flores NP to see patient for symptom management and supportive care. Chart  reviewed. Francis Sierra is a 59 y.o. male with recently diagnosed right breast cancer with likely bone mets and possible adrenal mets.  Patient was hospitalized at [facility]  1/11/25-1/14/25  due to acute pulmonary edema and respiratory failure.  Was found to have a right hilar mass and right breast mass as well as multiple bony lesions suspicious for malignancy.  He underwent a CT guided biopsy of right breast on 1/13/25., path positive for invasive carcinoma.  Patient was seen by the inpatient Palliative Medicine team during his hospitalization.  He is scheduled to see Dr. Landa on 1/30/25.     Referral states that his sister Adriana Sierra (who is a nurse) is his primary support person.    Per Radha Flores, Oncology will cover his med refills until he sees outpatient Palliative.      ACP:   No ACP documents on file     This nurse called patient, no answer, left message.  This nurse called patient's sister Adriana Sierra,  Appointment scheduled for 2/12/25 at 10am with Dr. Leeann Betancur.  This nurse will call patient if Dr. Betancur has a cancellation prior to 2/12/25.    Lu Harris RN, Gerontological Nursing-BC, [facility]

## 2025-01-16 NOTE — PROGRESS NOTES
[facility] Oncology Social Work   Psychosocial Assessment      Location: Medical Oncology at [facility]  Patient: Francis Sierra (1965)    Reason for Assessment: Initial Assessment, New Diagnosis    Advance Care Planning: ACP conversation deferred at this time    Sources of Information: Patient, Family, Staff, Medical Record    Mental Status: Alert, Oriented to Person, Oriented to Place, Oriented to Time, Oriented to Situation    Relationship Status: Single,     Living Circumstances: Per sister, pt will move in with her    Employment Status: Employed Part-time    Transportation Resources: Self, Family/Friends    Barriers to Learning: No Barriers Identified, Challenges Understanding Medical Jargon    Financial/Legal Concerns: Insured but concerned about affording out-of-pockets medical costs, Limited Income/Resources    Anabaptism/Spiritual/Existential: Conversation deferred    Support System: Fair Support: The patient has a moderate support network that provides occasional or inconsistent assistance, meeting some but not all of their emotional and practical needs.  Patient's Primary Support Person/Group: sister Adriana    History of Mental Health / Substance Use Disorders: Unknown  Conversation deferred    Coping with Illness: Challenges Coping with Serious Illness, Situational Depression, Situational Anxiety - unsure - didn't want to discuss prior employment at [email or web address]           Concerns/Barriers to Care: None    Narrative:     Met with patient  and his sister Adriana Sierra,  to introduce social work navigator role and supports.  Pt had been employed by [email or web address] but because of dust at his employment which was causing him problems breathing.   He quit and he paid for STD/LTD and he is frustrated he didn't have a dx at that time.  PT  for 20 years, 2 children who live out of state, son and dtr.  Pt needs a MRI and a bronchostomy to determine if mass in lung is TNBC or another primary cancer.

## 2025-01-16 NOTE — PROGRESS NOTES
[facility] Lab Draw Note:  Add on appointment from MD office  Arrived - 1115    Lab kit drawn peripherally from left arm.    1130 - Tolerated well.  Pt denies any acute problems/changes. Discharged from [facility] ambulatory. No distress. No further appointments scheduled. MD office aware kit is ready for .

## 2025-01-16 NOTE — PROGRESS NOTES
Subjective: (As above and below)     Chief Complaint   Patient presents with    Follow-Up from Hospital     Pt states went to [facility] for shortness of breathe and  came home 1/14/25     Francis Sierra is a 59 y.o. year old male who presents for     Hypertension ROS:  taking medications as instructed, no medication side effects noted, no TIAs, no chest pain on exertion, no dyspnea on exertion, no swelling of ankles    BP Readings from Last 3 Encounters:   01/16/25 105/83   01/16/25 121/83   01/14/25 (!) 152/91       Wt Readings from Last 3 Encounters:   01/16/25 (!) 136.6 kg (301 lb 1.6 oz)   01/16/25 (!) 137 kg (302 lb)   01/12/25 (!) 137.3 kg (302 lb 11.2 oz)       Hyperlipidemia; tolerating statin    Since last visit he has been dx w cancer: breast w mets to bone  And a lesion to lungs  He is est w heme-onc  He reports feeling okay- pain is managed  Mentally he feels okay    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  Family History   Problem Relation Age of Onset    Breast Cancer Sister     Cancer Brother        Past Medical History:   Diagnosis Date    Acute hypoxemic respiratory failure 01/11/2025    Alcohol abuse     Cancer (HCC) 2025    Former smoker     Hypercholesterolemia     Hypertension     GILDA (obstructive sleep apnea)     Prediabetes     Pulmonary hypertension (HCC)     Right heart failure with reduced right ventricular function (HCC)       Social History     Socioeconomic History    Marital status: Single     Spouse name: None    Number of children: None    Years of education: None    Highest education level: None   Tobacco Use    Smoking status: Former     Passive exposure: Never    Smokeless tobacco: Never   Vaping Use    Vaping status: Never Used

## 2025-01-16 NOTE — PROGRESS NOTES
NCCN Distress Thermometer    Medical Oncology at [facility]    Date Screening Completed:     Screening Declined:  [] Yes    Number that best describes how much distress you've experienced in the past week, including today?  0 [] - No distress 1 []      2 []      3 [x]      4 []       5 []       6 []      7 []      8 []      9 []       10 [] - Extreme distress    PROBLEM LIST  Have you had concerns about any of the items below in the past week, including today?      Physical Concerns Practical Concerns   [x] Pain [] Taking care of myself    [] Sleep [] Taking care of others    [] Fatigue [] Safety   [] Tobacco use  [] Work   [] Substance use  [] School   [] Memory or concentration [] Housing/Utilities   [] Sexual health [] Finances   [] Changes in eating  [] Insurance   [x] Loss or change of physical abilities  [] Transportation    []    Emotional Concerns [] Having enough food   [] Worry or anxiety [] Access to medicine   [] Sadness or depression [] Treatment decisions   [x] Loss of interest or enjoyment     [] Grief or loss  Spiritual or Jain Concerns   [] Fear [] Sense of meaning or purpose   [] Loneliness  [] Changes in rachelle or beliefs   [] Anger [] Death, dying, or afterlife   [] Changes in appearance [] Conflict between beliefs and cancer treatments    [] Feelings of worthlessness or being a burden [] Relationship with the sacred    [] Ritual or dietary needs    Social Concerns     [] Relationship with spouse or partner     [] Relationship with children    [] Relationship with family members     [] Relationship with friends or coworkers     [] Communication with health care team     [] Ability to have children     [] Prejudice or discrimination        Other Concerns:

## 2025-01-17 PROBLEM — J96.01 ACUTE HYPOXEMIC RESPIRATORY FAILURE: Status: RESOLVED | Noted: 2025-01-11 | Resolved: 2025-01-17

## 2025-01-20 ENCOUNTER — APPOINTMENT (OUTPATIENT)
Facility: HOSPITAL | Age: 60
End: 2025-01-20
Attending: INTERNAL MEDICINE
Payer: COMMERCIAL

## 2025-01-20 ENCOUNTER — HOSPITAL ENCOUNTER (OUTPATIENT)
Facility: HOSPITAL | Age: 60
Setting detail: OUTPATIENT SURGERY
Discharge: HOME | End: 2025-01-20
Attending: INTERNAL MEDICINE | Admitting: INTERNAL MEDICINE
Payer: COMMERCIAL

## 2025-01-20 ENCOUNTER — ANESTHESIA EVENT (OUTPATIENT)
Facility: HOSPITAL | Age: 60
End: 2025-01-20
Payer: COMMERCIAL

## 2025-01-20 ENCOUNTER — ANESTHESIA (OUTPATIENT)
Facility: HOSPITAL | Age: 60
End: 2025-01-20
Payer: COMMERCIAL

## 2025-01-20 VITALS
WEIGHT: 306 LBS | RESPIRATION RATE: 23 BRPM | SYSTOLIC BLOOD PRESSURE: 134 MMHG | HEIGHT: 72 IN | OXYGEN SATURATION: 92 % | HEART RATE: 101 BPM | BODY MASS INDEX: 41.45 KG/M2 | TEMPERATURE: 98.2 F | DIASTOLIC BLOOD PRESSURE: 81 MMHG

## 2025-01-20 LAB
GLUCOSE BLD STRIP.AUTO-MCNC: 152 MG/DL (ref 65–117)
GLUCOSE BLD STRIP.AUTO-MCNC: 178 MG/DL (ref 65–117)
SERVICE CMNT-IMP: ABNORMAL
SERVICE CMNT-IMP: ABNORMAL

## 2025-01-20 PROCEDURE — 36415 COLL VENOUS BLD VENIPUNCTURE: CPT

## 2025-01-20 PROCEDURE — 3600007502: Performed by: INTERNAL MEDICINE

## 2025-01-20 PROCEDURE — 7100000010 HC PHASE II RECOVERY - FIRST 15 MIN: Performed by: INTERNAL MEDICINE

## 2025-01-20 PROCEDURE — 3700000001 HC ADD 15 MINUTES (ANESTHESIA): Performed by: INTERNAL MEDICINE

## 2025-01-20 PROCEDURE — 6360000002 HC RX W HCPCS: Performed by: NURSE ANESTHETIST, CERTIFIED REGISTERED

## 2025-01-20 PROCEDURE — 3600007512: Performed by: INTERNAL MEDICINE

## 2025-01-20 PROCEDURE — 3700000000 HC ANESTHESIA ATTENDED CARE: Performed by: INTERNAL MEDICINE

## 2025-01-20 PROCEDURE — 82962 GLUCOSE BLOOD TEST: CPT

## 2025-01-20 PROCEDURE — 2500000003 HC RX 250 WO HCPCS: Performed by: NURSE ANESTHETIST, CERTIFIED REGISTERED

## 2025-01-20 PROCEDURE — 2720000010 HC SURG SUPPLY STERILE: Performed by: INTERNAL MEDICINE

## 2025-01-20 PROCEDURE — 71045 X-RAY EXAM CHEST 1 VIEW: CPT

## 2025-01-20 PROCEDURE — 87116 MYCOBACTERIA CULTURE: CPT

## 2025-01-20 PROCEDURE — 87206 SMEAR FLUORESCENT/ACID STAI: CPT

## 2025-01-20 PROCEDURE — 87070 CULTURE OTHR SPECIMN AEROBIC: CPT

## 2025-01-20 PROCEDURE — 88305 TISSUE EXAM BY PATHOLOGIST: CPT

## 2025-01-20 PROCEDURE — 88172 CYTP DX EVAL FNA 1ST EA SITE: CPT

## 2025-01-20 PROCEDURE — 2580000003 HC RX 258: Performed by: INTERNAL MEDICINE

## 2025-01-20 PROCEDURE — C1725 CATH, TRANSLUMIN NON-LASER: HCPCS | Performed by: INTERNAL MEDICINE

## 2025-01-20 PROCEDURE — 2709999900 HC NON-CHARGEABLE SUPPLY: Performed by: INTERNAL MEDICINE

## 2025-01-20 PROCEDURE — 87205 SMEAR GRAM STAIN: CPT

## 2025-01-20 PROCEDURE — 88173 CYTOPATH EVAL FNA REPORT: CPT

## 2025-01-20 PROCEDURE — 7100000000 HC PACU RECOVERY - FIRST 15 MIN: Performed by: INTERNAL MEDICINE

## 2025-01-20 PROCEDURE — 7100000001 HC PACU RECOVERY - ADDTL 15 MIN: Performed by: INTERNAL MEDICINE

## 2025-01-20 RX ORDER — SODIUM CHLORIDE 0.9 % (FLUSH) 0.9 %
5-40 SYRINGE (ML) INJECTION EVERY 12 HOURS SCHEDULED
Status: DISCONTINUED | OUTPATIENT
Start: 2025-01-20 | End: 2025-01-20 | Stop reason: HOSPADM

## 2025-01-20 RX ORDER — DEXAMETHASONE SODIUM PHOSPHATE 4 MG/ML
INJECTION, SOLUTION INTRA-ARTICULAR; INTRALESIONAL; INTRAMUSCULAR; INTRAVENOUS; SOFT TISSUE
Status: DISCONTINUED | OUTPATIENT
Start: 2025-01-20 | End: 2025-01-20 | Stop reason: SDUPTHER

## 2025-01-20 RX ORDER — ONDANSETRON 2 MG/ML
4 INJECTION INTRAMUSCULAR; INTRAVENOUS
Status: DISCONTINUED | OUTPATIENT
Start: 2025-01-20 | End: 2025-01-20 | Stop reason: HOSPADM

## 2025-01-20 RX ORDER — EPHEDRINE SULFATE/0.9% NACL/PF 50 MG/5 ML
SYRINGE (ML) INTRAVENOUS
Status: DISCONTINUED | OUTPATIENT
Start: 2025-01-20 | End: 2025-01-20 | Stop reason: SDUPTHER

## 2025-01-20 RX ORDER — ROCURONIUM BROMIDE 10 MG/ML
INJECTION, SOLUTION INTRAVENOUS
Status: DISCONTINUED | OUTPATIENT
Start: 2025-01-20 | End: 2025-01-20 | Stop reason: SDUPTHER

## 2025-01-20 RX ORDER — SODIUM CHLORIDE 9 MG/ML
INJECTION, SOLUTION INTRAVENOUS PRN
Status: DISCONTINUED | OUTPATIENT
Start: 2025-01-20 | End: 2025-01-20 | Stop reason: HOSPADM

## 2025-01-20 RX ORDER — ONDANSETRON 2 MG/ML
INJECTION INTRAMUSCULAR; INTRAVENOUS
Status: DISCONTINUED | OUTPATIENT
Start: 2025-01-20 | End: 2025-01-20 | Stop reason: SDUPTHER

## 2025-01-20 RX ORDER — NALOXONE HYDROCHLORIDE 0.4 MG/ML
INJECTION, SOLUTION INTRAMUSCULAR; INTRAVENOUS; SUBCUTANEOUS PRN
Status: DISCONTINUED | OUTPATIENT
Start: 2025-01-20 | End: 2025-01-20 | Stop reason: HOSPADM

## 2025-01-20 RX ORDER — HYDROMORPHONE HYDROCHLORIDE 1 MG/ML
0.5 INJECTION, SOLUTION INTRAMUSCULAR; INTRAVENOUS; SUBCUTANEOUS EVERY 5 MIN PRN
Status: DISCONTINUED | OUTPATIENT
Start: 2025-01-20 | End: 2025-01-20 | Stop reason: HOSPADM

## 2025-01-20 RX ORDER — SUCCINYLCHOLINE CHLORIDE 20 MG/ML
INJECTION INTRAMUSCULAR; INTRAVENOUS
Status: DISCONTINUED | OUTPATIENT
Start: 2025-01-20 | End: 2025-01-20 | Stop reason: SDUPTHER

## 2025-01-20 RX ORDER — PROCHLORPERAZINE EDISYLATE 5 MG/ML
5 INJECTION INTRAMUSCULAR; INTRAVENOUS
Status: DISCONTINUED | OUTPATIENT
Start: 2025-01-20 | End: 2025-01-20 | Stop reason: HOSPADM

## 2025-01-20 RX ORDER — SODIUM CHLORIDE 0.9 % (FLUSH) 0.9 %
5-40 SYRINGE (ML) INJECTION PRN
Status: DISCONTINUED | OUTPATIENT
Start: 2025-01-20 | End: 2025-01-20 | Stop reason: HOSPADM

## 2025-01-20 RX ORDER — FENTANYL CITRATE 50 UG/ML
50 INJECTION, SOLUTION INTRAMUSCULAR; INTRAVENOUS EVERY 5 MIN PRN
Status: DISCONTINUED | OUTPATIENT
Start: 2025-01-20 | End: 2025-01-20 | Stop reason: HOSPADM

## 2025-01-20 RX ORDER — PHENYLEPHRINE HCL IN 0.9% NACL 0.4MG/10ML
SYRINGE (ML) INTRAVENOUS
Status: DISCONTINUED | OUTPATIENT
Start: 2025-01-20 | End: 2025-01-20 | Stop reason: SDUPTHER

## 2025-01-20 RX ORDER — GLYCOPYRROLATE 0.2 MG/ML
INJECTION INTRAMUSCULAR; INTRAVENOUS
Status: DISCONTINUED | OUTPATIENT
Start: 2025-01-20 | End: 2025-01-20 | Stop reason: SDUPTHER

## 2025-01-20 RX ORDER — MEPERIDINE HYDROCHLORIDE 25 MG/ML
12.5 INJECTION INTRAMUSCULAR; INTRAVENOUS; SUBCUTANEOUS EVERY 5 MIN PRN
Status: DISCONTINUED | OUTPATIENT
Start: 2025-01-20 | End: 2025-01-20 | Stop reason: HOSPADM

## 2025-01-20 RX ORDER — SODIUM CHLORIDE, SODIUM LACTATE, POTASSIUM CHLORIDE, CALCIUM CHLORIDE 600; 310; 30; 20 MG/100ML; MG/100ML; MG/100ML; MG/100ML
INJECTION, SOLUTION INTRAVENOUS CONTINUOUS PRN
Status: COMPLETED | OUTPATIENT
Start: 2025-01-20 | End: 2025-01-20

## 2025-01-20 RX ADMIN — ROCURONIUM BROMIDE 10 MG: 10 INJECTION INTRAVENOUS at 16:09

## 2025-01-20 RX ADMIN — SUGAMMADEX 400 MG: 100 INJECTION, SOLUTION INTRAVENOUS at 16:35

## 2025-01-20 RX ADMIN — ROCURONIUM BROMIDE 10 MG: 10 INJECTION INTRAVENOUS at 14:42

## 2025-01-20 RX ADMIN — Medication 100 MCG: at 14:54

## 2025-01-20 RX ADMIN — GLYCOPYRROLATE 0.2 MG: 0.2 INJECTION, SOLUTION INTRAMUSCULAR; INTRAVENOUS at 16:00

## 2025-01-20 RX ADMIN — Medication 80 MCG: at 15:49

## 2025-01-20 RX ADMIN — ROCURONIUM BROMIDE 10 MG: 10 INJECTION INTRAVENOUS at 14:35

## 2025-01-20 RX ADMIN — ROCURONIUM BROMIDE 10 MG: 10 INJECTION INTRAVENOUS at 15:40

## 2025-01-20 RX ADMIN — LIDOCAINE HYDROCHLORIDE 100 MG: 20 INJECTION, SOLUTION EPIDURAL; INFILTRATION; INTRACAUDAL; PERINEURAL at 14:30

## 2025-01-20 RX ADMIN — Medication 80 MCG: at 14:48

## 2025-01-20 RX ADMIN — ROCURONIUM BROMIDE 10 MG: 10 INJECTION INTRAVENOUS at 15:05

## 2025-01-20 RX ADMIN — SUCCINYLCHOLINE CHLORIDE 200 MG: 20 INJECTION, SOLUTION INTRAMUSCULAR; INTRAVENOUS at 14:30

## 2025-01-20 RX ADMIN — Medication 80 MCG: at 15:27

## 2025-01-20 RX ADMIN — ROCURONIUM BROMIDE 10 MG: 10 INJECTION INTRAVENOUS at 14:30

## 2025-01-20 RX ADMIN — Medication 5 MG: at 15:02

## 2025-01-20 RX ADMIN — Medication 80 MCG: at 14:45

## 2025-01-20 RX ADMIN — ROCURONIUM BROMIDE 10 MG: 10 INJECTION INTRAVENOUS at 15:20

## 2025-01-20 RX ADMIN — Medication 80 MCG: at 14:30

## 2025-01-20 RX ADMIN — DEXAMETHASONE SODIUM PHOSPHATE 4 MG: 4 INJECTION, SOLUTION INTRA-ARTICULAR; INTRALESIONAL; INTRAMUSCULAR; INTRAVENOUS; SOFT TISSUE at 14:40

## 2025-01-20 RX ADMIN — ONDANSETRON HYDROCHLORIDE 4 MG: 2 INJECTION, SOLUTION INTRAMUSCULAR; INTRAVENOUS at 14:40

## 2025-01-20 RX ADMIN — PROPOFOL 200 MG: 10 INJECTION, EMULSION INTRAVENOUS at 14:30

## 2025-01-20 RX ADMIN — Medication 40 MCG: at 15:02

## 2025-01-20 ASSESSMENT — PAIN - FUNCTIONAL ASSESSMENT: PAIN_FUNCTIONAL_ASSESSMENT: NONE - DENIES PAIN

## 2025-01-20 NOTE — PROGRESS NOTES
Endoscopy Case End Note:    1640:  Procedure scope was pre-cleaned, per protocol, at bedside by PALMIRA Edouard.      1644: Patient transferring over to PACU.

## 2025-01-20 NOTE — PROCEDURES
Name: Francis Sierra MRN: [id number]   : 1965 Hospital: [facility]   Date: 2025        Bronchoscopy with Endobronchial Ultrasound-Guided Transbronchial Needle Aspiration    Procedure: Diagnostic bronchoscopy.    Indication: Abnormal chest imaging    Consent/Treatment: Informed consent was obtained from the  patient after risks, benefits and alternatives were explained. Timeout verified the correct patient and correct procedure.     Anesthesia:   Patient on ventilator and receiving  see anesthesia notes     Procedure Details:   -- The bronchoscope was introduced through an endotracheal tube.   -- The vocal cords were found to be normal.  -- The trachea and breezy were completely inspected and were found to be normal.  -- The right-sided endobronchial anatomy was completely inspected and right upper lobe airway narrowed consistent with extrinsic compression.  The apical segment was nearly completely closed.  Bubbling noted with fluid administration but scope could not pass. .  -- The left-sided endobronchial anatomy was completely inspected and were found to be normal.     The bronchoscope was then withdrawn and an EBUS bronchoscope was introduced into the trachea. Mediastinal inspection revealed:    5   Passes were made a lymph node station 10L  5  Passes were made a lymph node station 7  10 passes were made at lymph node station 10L  2 passes were made at lymph node station 4R  2 passes were made in the right paratracheal mass     Specimens:   Bronchial washings were sent for  cytology    Rapid On-Site Evaluation: Lymphatic tissue, but no pathology    Complications:  airway bleeding less than 100 cc controlled with Iced saline flushes     Estimated Blood Loss: less than 50     Angela Loja MD

## 2025-01-20 NOTE — DISCHARGE INSTRUCTIONS
Food:   Your physician or nurse will tell you when you can try a sip of cold water  and if tolerated, you may start your regular diet   After the procedure:   Your throat may be sore or your voice hoarse when the anesthetic wears off. This will soon go  away. You can help your throat feel better by:  -  sucking lozenges  -  gargling with warm salt water  -  drinking warm liquids    You may have a slight fever for 24 hours.    You may cough up blood tinged (pinkish) mucous.  Notify your doctor or go to the nearest Emergency department  if you experience:    Shortness of breath/chest pain    Coughing up more than a teaspoon of blood    Fever or chills after 24 hours    Dizziness accompanied by a fainting spell.

## 2025-01-20 NOTE — ANESTHESIA PRE PROCEDURE
Department of Anesthesiology  Preprocedure Note       Name:  Francis Sierra   Age:  59 y.o.  :  1965                                          MRN:  [id number]         Date:  2025      Surgeon: Surgeon(s):  Angela Loja MD    Procedure: Procedure(s):  ENDOBRONCHIAL ULTRASOUND    Medications prior to admission:   Prior to Admission medications    Medication Sig Start Date End Date Taking? Authorizing Provider   Nebulizers (COMPRESSOR/NEBULIZER) MISC 1 each by Does not apply route 4 times daily as needed (sob, wheezing) 25   Bhavna Swenson APRN - NP   insulin glargine (LANTUS SOLOSTAR) 100 UNIT/ML injection pen Inject 26 Units into the skin daily 25   Bre Sanches APRN - CNS   Insulin Pen Needle (B-D ULTRAFINE III SHORT PEN) 31G X 8 MM MISC 1 each by Does not apply route daily Pharmacist to identify & substitute with preferred needle for insulin pen device. 25  Bre Sanches APRN - CNS   glucose monitoring kit 1 kit by Does not apply route daily 25   Bre Sanches APRN - CNS   Lancets MISC 1 each by Does not apply route daily 25   Bre Sanches APRN - CNS   blood glucose monitor strips 1 strip by Other route daily Test daily 25  Bre Sanches APRN - CNS   ipratropium 0.5 mg-albuterol 2.5 mg (DUONEB) 0.5-2.5 (3) MG/3ML SOLN nebulizer solution Inhale 3 mLs into the lungs every 4 hours as needed for Shortness of Breath or Wheezing 25  Francis Lowe MD   predniSONE (DELTASONE) 20 MG tablet Take 1 tablet by mouth daily for 10 days 1/15/25 1/25/25  Francis Lowe MD   guaiFENesin (MUCINEX) 600 MG extended release tablet Take 1 tablet by mouth 2 times daily for 7 days 25  Francis Lowe MD   polyethylene glycol (GLYCOLAX) 17 g packet Take 1 packet by mouth daily as needed for Constipation 25  Radha Flores APRN - NP   oxyCODONE (ROXICODONE) 5 MG immediate release tablet Take 1.5 tablets by

## 2025-01-20 NOTE — PROGRESS NOTES
ARRIVAL INFORMATION:  Verified patient name and date of birth, scheduled procedure, and informed consent.     : Adriana (sister) contact number: [phone]  Physician and staff can share information with the .     Receive texts: yes    Belongings with patient include:  Clothing,Glasses, ,keys, wallet, phone    GI FOCUSED ASSESSMENT:  Neuro: Awake, alert, oriented x4  Respiratory: even and unlabored, CONTRERAS  GI: obese, soft  EKG Rhythm: sinus tachycardia    Education:Reviewed general discharge instructions and  information.

## 2025-01-20 NOTE — H&P
Endoscopy History and Physical      Chief Complaint: Here for bronchoscopy/EBUS-TBNA    HPI: The patient is a 59 year old male who is here for the procedure as above.    Patient denies any SOB, chest pain, fever, chills or rigors.     Patient has not taken any of her anticoagulants or anti-platelet.     Review of Systems: All other systems have been reviewed and are negative except per HPI    Past Medical History:  Past Medical History:   Diagnosis Date    Acute hypoxemic respiratory failure 01/11/2025    Alcohol abuse     Cancer (HCC) 2025    lung, bone, breast    Diabetes (HCC)     type 2    Former smoker     Hypercholesterolemia     pt denies    Hypertension     GILDA (obstructive sleep apnea)     uses bipap    Pulmonary hypertension (HCC)     Right heart failure with reduced right ventricular function (HCC)        Past Surgical History:  Past Surgical History:   Procedure Laterality Date    US BREAST BIOPSY W LOC DEVICE 1ST LESION RIGHT Right 1/13/2025    US BREAST BIOPSY W LOC DEVICE 1ST LESION RIGHT 1/13/2025 [facility] RAD US       Social History:   reports that he quit smoking about 38 years ago. His smoking use included cigarettes. He started smoking about 5 years ago. He has never been exposed to tobacco smoke. He has never used smokeless tobacco. He reports current alcohol use of about 15.0 standard drinks of alcohol per week. He reports that he does not currently use drugs.     Family History:  Family History   Problem Relation Age of Onset    Breast Cancer Sister     Cancer Brother        Allergies:  Allergies   Allergen Reactions    Carvedilol Diarrhea     Abdominal pain       Medications:   Current Facility-Administered Medications   Medication Dose Route Frequency Provider Last Rate Last Admin    sodium chloride flush 0.9 % injection 5-40 mL  5-40 mL IntraVENous 2 times per day [place]

## 2025-01-20 NOTE — FLOWSHEET NOTE
01/20/25 1655   Handoff   Communication Given Periop Handoff/Relief   Handoff phase Phase I receiving   Handoff Given To Bernardo Wiggins RN   Handoff Received From Radha Pascal RN/Estella Anderson CRNA   Handoff Communication Face to Face;At bedside   Time Handoff Given 4201

## 2025-01-20 NOTE — ANESTHESIA POSTPROCEDURE EVALUATION
Department of Anesthesiology  Postprocedure Note    Patient: Francis Sierra  MRN: [id number]  YOB: 1965  Date of evaluation: 1/20/2025    Procedure Summary       Date: 01/20/25 Room / Location: [facility] ENDO 04 / [facility] ENDOSCOPY    Anesthesia Start: 1424 Anesthesia Stop: 1658    Procedure: ENDOBRONCHIAL ULTRASOUND (Chest) Diagnosis:       Shortness of breath      (Shortness of breath [R06.02])    Surgeons: Angela Loja MD Responsible Provider: Robert Rhodes MD    Anesthesia Type: General ASA Status: 3            Anesthesia Type: General    Layla Phase I: Layla Score: 9    Layla Phase II:      Anesthesia Post Evaluation    Patient location during evaluation: PACU  Patient participation: complete - patient participated  Level of consciousness: sleepy but conscious and responsive to verbal stimuli  Airway patency: patent  Nausea & Vomiting: no vomiting and no nausea  Cardiovascular status: blood pressure returned to baseline and hemodynamically stable  Respiratory status: acceptable  Hydration status: stable  Pain management: adequate    No notable events documented.

## 2025-01-20 NOTE — FLOWSHEET NOTE
01/20/25 1288   AVS Reviewed   AVS & discharge instructions reviewed with patient and/or representative? Yes   Reviewed instructions with Patient;Other (name and relationship in comment)  (Sister-Adriana)   Level of Understanding Questions answered;Verbalized understanding

## 2025-01-21 ENCOUNTER — CLINICAL DOCUMENTATION (OUTPATIENT)
Age: 60
End: 2025-01-21

## 2025-01-22 LAB — CYTOLOGY-NON GYN: NORMAL

## 2025-01-24 ENCOUNTER — TELEPHONE (OUTPATIENT)
Age: 60
End: 2025-01-24

## 2025-01-24 DIAGNOSIS — C79.51 METASTATIC CANCER TO BONE (HCC): Primary | ICD-10-CM

## 2025-01-24 RX ORDER — OXYCODONE HYDROCHLORIDE 20 MG/1
20 TABLET ORAL EVERY 6 HOURS PRN
Qty: 60 TABLET | Refills: 0 | Status: SHIPPED | OUTPATIENT
Start: 2025-01-24 | End: 2025-02-08

## 2025-01-24 RX ORDER — DEXAMETHASONE 4 MG/1
4 TABLET ORAL 3 TIMES DAILY
Qty: 21 TABLET | Refills: 0 | Status: SHIPPED | OUTPATIENT
Start: 2025-01-24 | End: 2025-01-31

## 2025-01-24 NOTE — TELEPHONE ENCOUNTER
Returned call to patient reports pain in back is extreme 8-9/10    On Hand Oxycodone 5 mg, 7.5 mg  every 4 hours ,Prescribed by PM inpatient,  Last dose 8:30 am , remaining (est) #12 tabs remaining, will not have enough medication to get to appointment     Also     Tylenol arthritis 3-4 times daily without relief     New patient appointment 1/30/2025 @ 8:30 am

## 2025-01-24 NOTE — TELEPHONE ENCOUNTER
Palliative Medicine  Nursing Note  [phone]  Fax [phone]      Telephone Call  Patient Name: Francis Sierra  YOB: [birth year]    Incoming call from Mr. Sierra who states that the Oxy IR 5 mg, takes 1.5 tabs for a total of 7.5 mg, is not helping his pain. His pain is to his lumbar area in his back and with certain movements his pain is sudden and severe, \"off the chart\". He said if he remains very still and doesn't move he is ok, but trying to get up from lying down or change positions while sitting can be excruciating and takes hours to recover from. He was scheduled for an MRI and a CT scan today but was unable to complete either because he can't lay flat and the pain is too severe.  He is asking for help with his pain as the medication currently isn't controlling it.       Magda Thornton RN  Palliative Medicine

## 2025-01-24 NOTE — TELEPHONE ENCOUNTER
Palliative Medicine  Nursing Note  [phone]  Fax [phone]      Telephone Call  Patient Name: Francis Sierra  YOB: [birth year]    Incoming call from Ms. Sierra, patient's sister, HIPAA verified. She had concerns regarding her brothers pain. Discussed the medication changes that Dr. Betancur has suggested and will be sending into the pharmacy; Oxy IR 20 mg 1 every 6 hours prn #60 and Dexamethasone 4 mg 3 x day x 7 days. Encouraged him to seek care via ER as his pain has changed significantly and he was unable to complete both his MRI and CT scans earlier today due to pain.    His sister stated that her brother was taking Prednisone 20 mg for past 10 days or so and his last dose was yesterday, 1/23/25. However, her brother had told her that he had been experiencing more pain with certain movements past 4 days. She shared that last night he was unable to lay down and had to sleep sitting upright on a bar stool while resting his head on the counter. She also verified that this pain was worse than he had experienced prior to his hospitalization a few weeks ago. Discussed that this information would be provided with Dr. Betancur.    Call placed to Mr. Sierra and left voice mail message about the medication changes and if he could have a virtual visit with Dr. Betancur Monday 1/27/25 around 9 AM. Encouraged him to go to ER if pain changed and became worse and informed that palliative on-call is available for questions or concerns.       Magda Thornton RN  Palliative Medicine

## 2025-01-24 NOTE — PROGRESS NOTES
Palliative Medicine  Nursing Note  [phone]  Fax [phone]      Telephone Call  Patient Name: Francis Sierra  YOB: 1965/2025    Per Dr. Betancur, orders pended for Oxy IR 20 mg 1 every 6 hours prn #60 and Dexamethasone 4 mg 3 x daily x 7 days #21.      Magda Thornton, PALMIRA  Palliative Medicine

## 2025-01-25 LAB
ACID FAST STN SPEC: NEGATIVE
BACTERIA SPEC CULT: NORMAL
GRAM STN SPEC: NORMAL
MYCOBACTERIUM SPEC QL CULT: NORMAL
SERVICE CMNT-IMP: NORMAL
SPECIMEN PREPARATION: NORMAL
SPECIMEN SOURCE: NORMAL

## 2025-02-11 LAB
CARIS GENOMIC LOH - EXOME: NORMAL
CARIS HLA-A: NORMAL
CARIS HLA-B: NORMAL
CARIS HLA-C: NORMAL
CARIS MSI - EXOME: NORMAL
CARIS TMB - EXOME: NORMAL

## 2025-02-27 LAB
ACID FAST STN SPEC: NEGATIVE
MYCOBACTERIUM AVIUM COMPLEX: NEGATIVE
MYCOBACTERIUM SPEC QL CULT: POSITIVE
MYCOBACTERIUM TUBERCULOSIS COMPLEX: NEGATIVE
OTHER: NORMAL
REFLEX ID BY MALDI: NORMAL
SOURCE: NORMAL
SPECIMEN PREPARATION: ABNORMAL
SPECIMEN SOURCE: ABNORMAL

## 2025-03-13 LAB
ACID FAST STN SPEC: NEGATIVE
MYCOBACTERIA ID BY MALDI: ABNORMAL
MYCOBACTERIUM AVIUM COMPLEX: NEGATIVE
MYCOBACTERIUM SPEC QL CULT: POSITIVE
MYCOBACTERIUM TUBERCULOSIS COMPLEX: NEGATIVE
ORGANISM ID BY MALDI: NORMAL
OTHER: NORMAL
REFLEX ID BY MALDI: NORMAL
SOURCE, 60200063: NORMAL
SOURCE: ABNORMAL
SOURCE: NORMAL
SPECIMEN PREPARATION: ABNORMAL
SPECIMEN SOURCE: ABNORMAL
SUSCEPT TESTING: NORMAL

## (undated) DEVICE — SYRINGE MEDICAL 3ML CLEAR PLASTIC STANDARD NON CONTROL LUERLOCK TIP DISPOSABLE

## (undated) DEVICE — 1200 GUARD II KIT W/5MM TUBE W/O VAC TUBE: Brand: GUARDIAN

## (undated) DEVICE — NEEDLE ASPIR 21GA L700MM US GUID TREAT DST END FOR EFFICIENT

## (undated) DEVICE — SYRINGE MED 10ML LUERLOCK TIP W/O SFTY DISP

## (undated) DEVICE — CUFF BLD PRSS AD CLTH SGL TB W/ BAYNT CONN ROUNDED CORNER

## (undated) DEVICE — SENSOR PLSE OXMTR NEO AD 40KG ADH DISP NELLCOR

## (undated) DEVICE — CATHETER IV 20 GAX1 IN N WNG KINK RESIST INSYT AUTOGRD

## (undated) DEVICE — Device: Brand: BALLOON

## (undated) DEVICE — Device

## (undated) DEVICE — SINGLE USE SUCTION VALVE MAJ-209: Brand: SINGLE USE SUCTION VALVE (STERILE)

## (undated) DEVICE — SYRINGE MED 20ML STD CLR PLAS LUERSLIP TIP N CTRL DISP

## (undated) DEVICE — SOLIDIFIER FLD 2OZ 1500CC N DISINF IN BTL DISP SAFESORB

## (undated) DEVICE — IV STRT KT 3282] LSL INDUSTRIES INC]

## (undated) DEVICE — YANKAUER,TAPERED BULBOUS TIP,W/O VENT: Brand: MEDLINE

## (undated) DEVICE — SET ADMIN 16ML TBNG L100IN 2 Y INJ SITE IV PIGGY BK DISP (ORDER IN MULIPLES OF 48)

## (undated) DEVICE — SINGLE USE BIOPSY VALVE MAJ-210: Brand: SINGLE USE BIOPSY VALVE (STERILE)

## (undated) DEVICE — ELECTRODE,RADIOTRANSLUCENT,FOAM,5PK: Brand: MEDLINE